# Patient Record
Sex: FEMALE | Race: WHITE | NOT HISPANIC OR LATINO | Employment: OTHER | URBAN - METROPOLITAN AREA
[De-identification: names, ages, dates, MRNs, and addresses within clinical notes are randomized per-mention and may not be internally consistent; named-entity substitution may affect disease eponyms.]

---

## 2017-01-10 ENCOUNTER — OFFICE VISIT (OUTPATIENT)
Dept: ORTHOPEDIC SURGERY | Facility: CLINIC | Age: 82
End: 2017-01-10

## 2017-01-10 DIAGNOSIS — M79.605 LOW BACK PAIN RADIATING TO LEFT LEG: Primary | ICD-10-CM

## 2017-01-10 DIAGNOSIS — M54.50 LOW BACK PAIN RADIATING TO LEFT LEG: Primary | ICD-10-CM

## 2017-01-10 DIAGNOSIS — M48.061 SPINAL STENOSIS OF LUMBAR REGION: ICD-10-CM

## 2017-01-10 PROCEDURE — 99214 OFFICE O/P EST MOD 30 MIN: CPT | Performed by: ORTHOPAEDIC SURGERY

## 2017-01-10 PROCEDURE — 72100 X-RAY EXAM L-S SPINE 2/3 VWS: CPT | Performed by: ORTHOPAEDIC SURGERY

## 2017-01-10 RX ORDER — HYDROCODONE BITARTRATE AND ACETAMINOPHEN 5; 325 MG/1; MG/1
1 TABLET ORAL EVERY 6 HOURS PRN
COMMUNITY

## 2017-01-10 RX ORDER — ACETAMINOPHEN 500 MG
500 TABLET ORAL EVERY 6 HOURS PRN
COMMUNITY

## 2017-01-10 RX ORDER — KETOROLAC TROMETHAMINE 30 MG/ML
INJECTION, SOLUTION INTRAMUSCULAR; INTRAVENOUS
COMMUNITY

## 2017-01-10 RX ORDER — MELOXICAM 15 MG/1
15 TABLET ORAL DAILY
COMMUNITY

## 2017-01-10 RX ORDER — UREA 10 %
3 LOTION (ML) TOPICAL
COMMUNITY

## 2017-01-10 RX ORDER — GABAPENTIN 300 MG/1
300 CAPSULE ORAL 3 TIMES DAILY
COMMUNITY

## 2017-01-10 RX ORDER — ASPIRIN 81 MG/1
81 TABLET ORAL DAILY
COMMUNITY

## 2017-01-10 RX ORDER — LORATADINE 10 MG/1
10 TABLET ORAL DAILY
COMMUNITY

## 2017-01-10 RX ORDER — SODIUM BICARBONATE 650 MG/1
650 TABLET ORAL 3 TIMES DAILY
COMMUNITY

## 2017-01-10 RX ORDER — LACTULOSE 10 G/15ML
10 SOLUTION ORAL DAILY
COMMUNITY

## 2017-01-10 NOTE — MR AVS SNAPSHOT
Gertrudis Jalen   1/10/2017 7:45 AM   Office Visit    Dept Phone:  [phone]   Encounter #:  [id number]    Provider:  Feliberto Darby MD   Department:  [facility]                Your Full Care Plan              Your Updated Medication List          This list is accurate as of: 1/10/17  9:03 AM.  Always use your most recent med list.                acetaminophen 500 MG tablet   Commonly known as:  TYLENOL       amiodarone 200 MG tablet   Commonly known as:  PACERONE       aspirin 81 MG EC tablet       CALCIUM + D PO       gabapentin 300 MG capsule   Commonly known as:  NEURONTIN       HYDROcodone-acetaminophen 5-325 MG per tablet   Commonly known as:  NORCO       ketorolac 30 MG/ML injection   Commonly known as:  TORADOL       lactulose 10 GM/15ML solution   Commonly known as:  CHRONULAC       loratadine 10 MG tablet   Commonly known as:  CLARITIN       melatonin 1 MG tablet       meloxicam 15 MG tablet   Commonly known as:  MOBIC       NORVASC 5 MG tablet   Generic drug:  amLODIPine       omeprazole 40 MG capsule   Commonly known as:  priLOSEC       sodium bicarbonate 650 MG tablet               We Performed the Following     XR Spine Lumbar AP & Lateral       You Were Diagnosed With        Codes Comments    Low back pain radiating to left leg    -  Primary ICD-10-CM: M54.5  ICD-9-CM: 724.2       Instructions     None    Patient Instructions History      Upcoming Appointments     Visit Type Date Time Department    FOLLOW UP 1/10/2017  7:45 AM [facility]      [employer] Signup     [facility] [employer] allows you to send messages to your doctor, view your test results, renew your prescriptions, schedule appointments, and more. To sign up, go to [employer] and click on the Sign Up Now link in the New User? box. Enter your [employer] Activation Code exactly as it appears below along with the last four digits of your Social  Security Number and your Date of Birth () to complete the sign-up process. If you do not sign up before the expiration date, you must request a new code.    [employer] Activation Code: [email or web address]  Expires: 2017  5:34 AM    If you have questions, you can email [email or web address] or call [phone] to talk to our [employer] staff. Remember, [employer] is NOT to be used for urgent needs. For medical emergencies, dial 911.               Other Info from Your Visit           Allergies     No Known Allergies      Reason for Visit     Lumbar Spine - Follow-up           Vital Signs     Smoking Status                   Never Smoker           Problems and Diagnoses Noted     Low back pain radiating to left leg    -  Primary

## 2017-01-10 NOTE — PROGRESS NOTES
She complains of severe him a chronic, variable bilateral leg pain pain below the knees.  She has peripheral neuropathy by her own account.  The pain is moderate to severe activity related but also present at rest.  No balance bowel or bladder complaints.  I did thoracic kyphoplasty is about a year ago from which she did well.  She is blind.  On exam she has excellent strength seemingly intact sensation and no pain to palpation of the thoracic or lumbar spine.  Overall posture is excellent.  Two-view x-rays of the lumbar spine obtained today in my office show excellent lumbar lordosis and no evidence of new fracture.  CT scan of the lumbar spine performed at [facility] last month demonstrated cystic dilatation of the sacrum from presumptive the dural cyst through which it appeared there was a subacute or old fracture.  She has severe stenosis at 3 for and 45 and no other new fractures.  The fracture noted at T9 is since been addressed.  I agree otherwise with the radiologist report.  I think she has leg pain from spinal stenosis, and there may be a contribution from neuropathy as well.  I'm sending her for lumbar epidural steroid injections and we'll see where to go from there..

## 2017-02-15 ENCOUNTER — HOSPITAL ENCOUNTER (OUTPATIENT)
Dept: GENERAL RADIOLOGY | Facility: HOSPITAL | Age: 82
Discharge: HOME | End: 2017-02-15

## 2017-02-15 ENCOUNTER — ANESTHESIA (OUTPATIENT)
Dept: PAIN MEDICINE | Facility: HOSPITAL | Age: 82
End: 2017-02-15

## 2017-02-15 ENCOUNTER — HOSPITAL ENCOUNTER (OUTPATIENT)
Dept: PAIN MEDICINE | Facility: HOSPITAL | Age: 82
Discharge: HOME | End: 2017-02-15
Attending: ORTHOPAEDIC SURGERY | Admitting: ORTHOPAEDIC SURGERY

## 2017-02-15 ENCOUNTER — ANESTHESIA EVENT (OUTPATIENT)
Dept: PAIN MEDICINE | Facility: HOSPITAL | Age: 82
End: 2017-02-15

## 2017-02-15 VITALS
OXYGEN SATURATION: 98 % | BODY MASS INDEX: 24.59 KG/M2 | DIASTOLIC BLOOD PRESSURE: 78 MMHG | HEIGHT: 66 IN | WEIGHT: 153 LBS | RESPIRATION RATE: 16 BRPM | HEART RATE: 69 BPM | SYSTOLIC BLOOD PRESSURE: 128 MMHG

## 2017-02-15 DIAGNOSIS — M48.061 SPINAL STENOSIS OF LUMBAR REGION: ICD-10-CM

## 2017-02-15 DIAGNOSIS — R52 PAIN: ICD-10-CM

## 2017-02-15 PROCEDURE — 77003 FLUOROGUIDE FOR SPINE INJECT: CPT

## 2017-02-15 PROCEDURE — 0 IOPAMIDOL 41 % SOLUTION: Performed by: ANESTHESIOLOGY

## 2017-02-15 PROCEDURE — C1755 CATHETER, INTRASPINAL: HCPCS

## 2017-02-15 PROCEDURE — 25010000002 METHYLPREDNISOLONE PER 80 MG: Performed by: ANESTHESIOLOGY

## 2017-02-15 RX ORDER — POTASSIUM CHLORIDE 750 MG/1
20 TABLET, EXTENDED RELEASE ORAL DAILY
COMMUNITY

## 2017-02-15 RX ORDER — SODIUM CHLORIDE 0.9 % (FLUSH) 0.9 %
1-10 SYRINGE (ML) INJECTION AS NEEDED
Status: DISCONTINUED | OUTPATIENT
Start: 2017-02-15 | End: 2017-02-16 | Stop reason: HOSPADM

## 2017-02-15 RX ORDER — FUROSEMIDE 40 MG/1
40 TABLET ORAL DAILY
COMMUNITY

## 2017-02-15 RX ORDER — FENTANYL CITRATE 50 UG/ML
50 INJECTION, SOLUTION INTRAMUSCULAR; INTRAVENOUS
Status: DISCONTINUED | OUTPATIENT
Start: 2017-02-15 | End: 2017-02-16 | Stop reason: HOSPADM

## 2017-02-15 RX ORDER — METHYLPREDNISOLONE ACETATE 80 MG/ML
80 INJECTION, SUSPENSION INTRA-ARTICULAR; INTRALESIONAL; INTRAMUSCULAR; SOFT TISSUE ONCE
Status: COMPLETED | OUTPATIENT
Start: 2017-02-15 | End: 2017-02-15

## 2017-02-15 RX ORDER — MIDAZOLAM HYDROCHLORIDE 1 MG/ML
1 INJECTION INTRAMUSCULAR; INTRAVENOUS
Status: DISCONTINUED | OUTPATIENT
Start: 2017-02-15 | End: 2017-02-16 | Stop reason: HOSPADM

## 2017-02-15 RX ORDER — LIDOCAINE HYDROCHLORIDE 10 MG/ML
1 INJECTION, SOLUTION INFILTRATION; PERINEURAL ONCE
Status: DISCONTINUED | OUTPATIENT
Start: 2017-02-15 | End: 2017-02-16 | Stop reason: HOSPADM

## 2017-02-15 RX ADMIN — METHYLPREDNISOLONE ACETATE 80 MG: 80 INJECTION, SUSPENSION INTRA-ARTICULAR; INTRALESIONAL; INTRAMUSCULAR; SOFT TISSUE at 14:33

## 2017-02-15 RX ADMIN — IOPAMIDOL 10 ML: 408 INJECTION, SOLUTION INTRATHECAL at 14:32

## 2017-02-15 NOTE — ANESTHESIA PROCEDURE NOTES
PAIN Epidural block    Patient location during procedure: pain clinic  Stop Time: 2/15/2017 2:31 PM  Indication:procedure for pain  Performed By  Anesthesiologist: EDVIN MARTINES  Preanesthetic Checklist  Completed: patient identified, site marked, surgical consent, pre-op evaluation, timeout performed, IV checked, risks and benefits discussed and monitors and equipment checked  Additional Notes  Fluoro used for needle placement    Dx:  Lumbar neuritis  Lumbar spinal stenosis  Lumbar degen disc dz.  Epidural Block Prep:  Pt Position:prone  Sterile Tech:cap, gloves, mask and sterile barrier  Monitoring:blood pressure monitoring, continuous pulse oximetry and EKG  Epidural Block Procedure:  Approach:midline  Guidance: fluoroscopy  Location:lumbar  Level:5-6  Needle Type:Tuohy  Needle Gauge:20  Aspiration:negative  Medications:  Depomedrol:80  Preservative Free Saline:3mL  Isovue:3mL  Comments:B/l spread  Post Assessment:  Dressing:occlusive dressing applied  Pt Tolerance:patient tolerated the procedure well with no apparent complications  Complications:no

## 2017-02-15 NOTE — H&P
[facility]    History and Physical    Patient Name: Gertrudis Rao  :  2/10/1923  MRN:  [id number]  Date of Admission: 2/15/2017    Subjective     Patient is a 89 y.o. female presents with chief complaint of chronic, intermitent, moderate low back pain.  Onset of symptoms was gradual starting 8 months ago.  Symptoms are associated/aggravated by nothing in particular. Symptoms improve with nothing.  Pain 4-8/10 depending on activity.  Radiates into lower extremities.  Denies numbness/tingling/weakness.  Presents for [facility].      The following portions of the patients history were reviewed and updated as appropriate: current medications, allergies, past medical history, past surgical history, past family history, past social history and problem list                Objective     Past Medical History:   Past Medical History   Diagnosis Date   • Cardiac disease    • Diabetes mellitus    • Hypertension      Past Surgical History:   Past Surgical History   Procedure Laterality Date   • Cholecystectomy     • Tonsillectomy       Family History:   Family History   Problem Relation Age of Onset   • Leukemia Mother    • Cancer Father      stomach   • Cancer Sister      pancreatic   • Cancer Brother      kidney     Social History:   Social History   Substance Use Topics   • Smoking status: Never Smoker   • Smokeless tobacco: Not on file   • Alcohol use Not on file       Vital Signs Range for the last 24 hours  Temperature:     Temp Source:     BP:     Pulse:     Respirations:     SPO2:     O2 Amount (l/min):     O2 Devices     Weight:           --------------------------------------------------------------------------------    Current Outpatient Prescriptions   Medication Sig Dispense Refill   • acetaminophen (TYLENOL) 500 MG tablet Take 500 mg by mouth Every 6 (Six) Hours As Needed for mild pain (1-3).     • amiodarone (PACERONE) 200 MG tablet Take 100 mg by mouth. Every other day     • amLODIPine (NORVASC) 5 MG  tablet Take 5 mg by mouth Daily.     • aspirin 81 MG EC tablet Take 81 mg by mouth Daily.     • Calcium Citrate-Vitamin D (CALCIUM + D PO) Take  by mouth.     • gabapentin (NEURONTIN) 300 MG capsule Take 300 mg by mouth 3 (Three) Times a Day.     • HYDROcodone-acetaminophen (NORCO) 5-325 MG per tablet Take 1 tablet by mouth Every 6 (Six) Hours As Needed.     • ketorolac (TORADOL) 30 MG/ML injection Infuse  into a venous catheter.     • lactulose (CHRONULAC) 10 GM/15ML solution Take 20 g by mouth 2 (Two) Times a Day As Needed.     • loratadine (CLARITIN) 10 MG tablet Take 10 mg by mouth Daily.     • melatonin 1 MG tablet Take 3 mg by mouth.     • meloxicam (MOBIC) 15 MG tablet Take 15 mg by mouth Daily.     • omeprazole (priLOSEC) 40 MG capsule Take 40 mg by mouth Daily.     • sodium bicarbonate 650 MG tablet Take 650 mg by mouth 4 (Four) Times a Day.       No current facility-administered medications for this encounter.        --------------------------------------------------------------------------------  Assessment/Plan    Lumbar degen disc disease  Lumbar radiculopathy  Lumbar spinal stenosis    1. Lumbar Epidural with fluoroscopy +/- epidurogram (if needed)  2. Physical therapy exercises at home as prescribed by physical therapy or from the pain clinic handout (given to the patient). Continuation of these exercises every day, or multiple times per week, even when the patient has good pain relief, was stressed to the patient as a preventative measure to decrease the frequency and severity of future pain episodes.  3. Continue pain medicines as already prescribed. If patient not currently taking any, it is recommended to begin Acetaminophen 1000 mg po q 8 hours. If other medicines containing Acetaminophen are currently prescribed, maintain daily dose at 3000mg.   4. If they can tolerate NSAIDS, it is recommended to take Ibuprofen 600 mg po q 6 hours for 7 days during pain exacerbations. Alternatively, they may  substitute an NSAID of their choice (e.g. Aleve)  5. Heat and ice to the affected area as tolerated for pain control. It was discussed that heating pads can cause burns.  6. Low impact exercise such as walking or water exercise was recommended to maintain overall health and aid in weight control.   7. Follow up as needed for subsequent injections.  8. Patient was counseled to abstain from tobacco products.       Anesthesia Evaluation     Patient summary reviewed and Nursing notes reviewed   no history of anesthetic complications:     Airway   Mallampati: II  TM distance: >3 FB  Dental    (+) upper dentures    Pulmonary - negative pulmonary ROS and normal exam   Cardiovascular - normal exam    (+) hypertension,       Neuro/Psych- negative ROS and neuro exam normal  GI/Hepatic/Renal/Endo    (+)  GERD, diabetes mellitus,     Musculoskeletal (-) normal exam    (+) back pain, Straight Leg Test,   Abdominal  - normal exam   Substance History - negative use     OB/GYN negative ob/gyn ROS         Other                               Diagnosis and Plan    Treatment Plan  ASA 3      Procedures: Lumbar Epidural Steroid Injection(LESI), With fluoroscopy,       Anesthetic plan and risks discussed with patient.          * No Diagnosis Codes entered *

## 2017-06-06 ENCOUNTER — APPOINTMENT (OUTPATIENT)
Dept: GENERAL RADIOLOGY | Facility: HOSPITAL | Age: 82
End: 2017-06-06

## 2017-06-06 ENCOUNTER — APPOINTMENT (OUTPATIENT)
Dept: CT IMAGING | Facility: HOSPITAL | Age: 82
End: 2017-06-06

## 2017-06-06 ENCOUNTER — HOSPITAL ENCOUNTER (INPATIENT)
Facility: HOSPITAL | Age: 82
LOS: 1 days | Discharge: HOSPICE | End: 2017-06-08
Attending: EMERGENCY MEDICINE | Admitting: INTERNAL MEDICINE

## 2017-06-06 DIAGNOSIS — G92.8 TOXIC METABOLIC ENCEPHALOPATHY: ICD-10-CM

## 2017-06-06 DIAGNOSIS — R41.82 ALTERED MENTAL STATUS, UNSPECIFIED ALTERED MENTAL STATUS TYPE: Primary | ICD-10-CM

## 2017-06-06 PROBLEM — N39.0 RECURRENT UTI: Status: ACTIVE | Noted: 2017-06-06

## 2017-06-06 PROBLEM — Z74.09 IMMOBILITY: Status: ACTIVE | Noted: 2017-06-06

## 2017-06-06 PROBLEM — E86.0 DEHYDRATION: Status: ACTIVE | Noted: 2017-06-06

## 2017-06-06 PROBLEM — F03.90 DEMENTIA (HCC): Status: ACTIVE | Noted: 2017-06-06

## 2017-06-06 PROBLEM — H54.8 LEGALLY BLIND: Status: ACTIVE | Noted: 2017-06-06

## 2017-06-06 PROBLEM — E87.1 HYPONATREMIA: Status: ACTIVE | Noted: 2017-06-06

## 2017-06-06 LAB
ALBUMIN SERPL-MCNC: 3.8 G/DL (ref 3.5–5.2)
ALBUMIN/GLOB SERPL: 1.5 G/DL
ALP SERPL-CCNC: 96 U/L (ref 39–117)
ALT SERPL W P-5'-P-CCNC: 6 U/L (ref 1–33)
ANION GAP SERPL CALCULATED.3IONS-SCNC: 9.4 MMOL/L
AST SERPL-CCNC: 16 U/L (ref 1–32)
BACTERIA UR QL AUTO: NORMAL /HPF
BASOPHILS # BLD AUTO: 0.01 10*3/MM3 (ref 0–0.2)
BASOPHILS NFR BLD AUTO: 0.2 % (ref 0–1.5)
BILIRUB SERPL-MCNC: 0.2 MG/DL (ref 0.1–1.2)
BILIRUB UR QL STRIP: NEGATIVE
BUN BLD-MCNC: 18 MG/DL (ref 8–23)
BUN/CREAT SERPL: 17.8 (ref 7–25)
CALCIUM SPEC-SCNC: 9 MG/DL (ref 8.2–9.6)
CHLORIDE SERPL-SCNC: 92 MMOL/L (ref 98–107)
CLARITY UR: CLEAR
CO2 SERPL-SCNC: 27.6 MMOL/L (ref 22–29)
COLOR UR: YELLOW
CREAT BLD-MCNC: 1.01 MG/DL (ref 0.57–1)
DEPRECATED RDW RBC AUTO: 44 FL (ref 37–54)
EOSINOPHIL # BLD AUTO: 0.06 10*3/MM3 (ref 0–0.7)
EOSINOPHIL NFR BLD AUTO: 1 % (ref 0.3–6.2)
ERYTHROCYTE [DISTWIDTH] IN BLOOD BY AUTOMATED COUNT: 13.4 % (ref 11.7–13)
GFR SERPL CREATININE-BSD FRML MDRD: 51 ML/MIN/1.73
GLOBULIN UR ELPH-MCNC: 2.6 GM/DL
GLUCOSE BLD-MCNC: 129 MG/DL (ref 65–99)
GLUCOSE UR STRIP-MCNC: NEGATIVE MG/DL
HCT VFR BLD AUTO: 30.8 % (ref 35.6–45.5)
HGB BLD-MCNC: 10.4 G/DL (ref 11.9–15.5)
HGB UR QL STRIP.AUTO: ABNORMAL
HYALINE CASTS UR QL AUTO: NORMAL /LPF
IMM GRANULOCYTES # BLD: 0.03 10*3/MM3 (ref 0–0.03)
IMM GRANULOCYTES NFR BLD: 0.5 % (ref 0–0.5)
KETONES UR QL STRIP: NEGATIVE
LEUKOCYTE ESTERASE UR QL STRIP.AUTO: ABNORMAL
LYMPHOCYTES # BLD AUTO: 1.81 10*3/MM3 (ref 0.9–4.8)
LYMPHOCYTES NFR BLD AUTO: 29.4 % (ref 19.6–45.3)
MCH RBC QN AUTO: 30.5 PG (ref 26.9–32)
MCHC RBC AUTO-ENTMCNC: 33.8 G/DL (ref 32.4–36.3)
MCV RBC AUTO: 90.3 FL (ref 80.5–98.2)
MONOCYTES # BLD AUTO: 0.62 10*3/MM3 (ref 0.2–1.2)
MONOCYTES NFR BLD AUTO: 10.1 % (ref 5–12)
NEUTROPHILS # BLD AUTO: 3.63 10*3/MM3 (ref 1.9–8.1)
NEUTROPHILS NFR BLD AUTO: 58.8 % (ref 42.7–76)
NITRITE UR QL STRIP: NEGATIVE
PH UR STRIP.AUTO: <=5 [PH] (ref 5–8)
PLATELET # BLD AUTO: 169 10*3/MM3 (ref 140–500)
PMV BLD AUTO: 8.9 FL (ref 6–12)
POTASSIUM BLD-SCNC: 4.8 MMOL/L (ref 3.5–5.2)
PROT SERPL-MCNC: 6.4 G/DL (ref 6–8.5)
PROT UR QL STRIP: NEGATIVE
RBC # BLD AUTO: 3.41 10*6/MM3 (ref 3.9–5.2)
RBC # UR: NORMAL /HPF
REF LAB TEST METHOD: NORMAL
SODIUM BLD-SCNC: 129 MMOL/L (ref 136–145)
SP GR UR STRIP: 1.01 (ref 1–1.03)
SQUAMOUS #/AREA URNS HPF: NORMAL /HPF
TROPONIN T SERPL-MCNC: <0.01 NG/ML (ref 0–0.03)
UROBILINOGEN UR QL STRIP: ABNORMAL
WBC NRBC COR # BLD: 6.16 10*3/MM3 (ref 4.5–10.7)
WBC UR QL AUTO: NORMAL /HPF
YEAST URNS QL MICRO: NORMAL /HPF

## 2017-06-06 PROCEDURE — 80053 COMPREHEN METABOLIC PANEL: CPT | Performed by: EMERGENCY MEDICINE

## 2017-06-06 PROCEDURE — G0378 HOSPITAL OBSERVATION PER HR: HCPCS

## 2017-06-06 PROCEDURE — 93010 ELECTROCARDIOGRAM REPORT: CPT | Performed by: INTERNAL MEDICINE

## 2017-06-06 PROCEDURE — 70450 CT HEAD/BRAIN W/O DYE: CPT

## 2017-06-06 PROCEDURE — 71010 HC CHEST PA OR AP: CPT

## 2017-06-06 PROCEDURE — 93005 ELECTROCARDIOGRAM TRACING: CPT | Performed by: EMERGENCY MEDICINE

## 2017-06-06 PROCEDURE — 99285 EMERGENCY DEPT VISIT HI MDM: CPT

## 2017-06-06 PROCEDURE — 81001 URINALYSIS AUTO W/SCOPE: CPT | Performed by: EMERGENCY MEDICINE

## 2017-06-06 PROCEDURE — 84484 ASSAY OF TROPONIN QUANT: CPT | Performed by: EMERGENCY MEDICINE

## 2017-06-06 PROCEDURE — 85025 COMPLETE CBC W/AUTO DIFF WBC: CPT | Performed by: EMERGENCY MEDICINE

## 2017-06-06 PROCEDURE — 87086 URINE CULTURE/COLONY COUNT: CPT | Performed by: EMERGENCY MEDICINE

## 2017-06-06 RX ORDER — AMLODIPINE BESYLATE 5 MG/1
5 TABLET ORAL DAILY
Status: DISCONTINUED | OUTPATIENT
Start: 2017-06-07 | End: 2017-06-07

## 2017-06-06 RX ORDER — ONDANSETRON 2 MG/ML
4 INJECTION INTRAMUSCULAR; INTRAVENOUS EVERY 6 HOURS PRN
Status: DISCONTINUED | OUTPATIENT
Start: 2017-06-06 | End: 2017-06-07

## 2017-06-06 RX ORDER — PANTOPRAZOLE SODIUM 40 MG/1
40 TABLET, DELAYED RELEASE ORAL EVERY MORNING
Status: DISCONTINUED | OUTPATIENT
Start: 2017-06-07 | End: 2017-06-07

## 2017-06-06 RX ORDER — DEXTROSE MONOHYDRATE 25 G/50ML
25 INJECTION, SOLUTION INTRAVENOUS
Status: DISCONTINUED | OUTPATIENT
Start: 2017-06-06 | End: 2017-06-07

## 2017-06-06 RX ORDER — CETIRIZINE HYDROCHLORIDE 10 MG/1
5 TABLET ORAL DAILY
Status: DISCONTINUED | OUTPATIENT
Start: 2017-06-07 | End: 2017-06-07

## 2017-06-06 RX ORDER — SODIUM CHLORIDE 9 MG/ML
100 INJECTION, SOLUTION INTRAVENOUS CONTINUOUS
Status: DISCONTINUED | OUTPATIENT
Start: 2017-06-06 | End: 2017-06-07

## 2017-06-06 RX ORDER — SODIUM CHLORIDE 0.9 % (FLUSH) 0.9 %
1-10 SYRINGE (ML) INJECTION AS NEEDED
Status: DISCONTINUED | OUTPATIENT
Start: 2017-06-06 | End: 2017-06-07

## 2017-06-06 RX ORDER — HEPARIN SODIUM 5000 [USP'U]/ML
5000 INJECTION, SOLUTION INTRAVENOUS; SUBCUTANEOUS EVERY 12 HOURS SCHEDULED
Status: DISCONTINUED | OUTPATIENT
Start: 2017-06-06 | End: 2017-06-07

## 2017-06-06 RX ORDER — LACTULOSE 10 G/15ML
10 SOLUTION ORAL DAILY
Status: DISCONTINUED | OUTPATIENT
Start: 2017-06-07 | End: 2017-06-07

## 2017-06-06 RX ORDER — NICOTINE POLACRILEX 4 MG
15 LOZENGE BUCCAL
Status: DISCONTINUED | OUTPATIENT
Start: 2017-06-06 | End: 2017-06-07

## 2017-06-06 RX ORDER — ASPIRIN 81 MG/1
81 TABLET ORAL DAILY
Status: DISCONTINUED | OUTPATIENT
Start: 2017-06-07 | End: 2017-06-07

## 2017-06-06 RX ORDER — AMIODARONE HYDROCHLORIDE 100 MG/1
100 TABLET ORAL
Status: DISCONTINUED | OUTPATIENT
Start: 2017-06-07 | End: 2017-06-07

## 2017-06-06 NOTE — ED NOTES
Nursing home reports intermittent confusion for past 3 days. Pt is also legally blind, but reports vision is worse than usual for the past 2 days.      Annmarie Wills RN  06/06/17 5538

## 2017-06-06 NOTE — ED PROVIDER NOTES
EMERGENCY DEPARTMENT ENCOUNTER       CHIEF COMPLAINT  Chief Complaint: Altered Mental Status  History given by: Patient, Sons  History limited by: Patient's mental status change   Room Number: 26/26  PMD: Tigre Rao Jr., MD      HPI:  HPI Comments: Pt is a NH resident with h/o UTIs who presents to the ED with intermittent episodes of altered mental status onset about 2 days ago. Pt has had visual hallucinations and has been disoriented; this AM, sons found pt crying in her bed and pt was unsure as to why she was crying. Per family, pt has been receiving antibiotic injections to treat a UTI for the past week. Pt has not had any focal weakness/numbness. There are no other complaints at this time.     Patient is a 89 y.o. female presenting with altered mental status.   Altered Mental Status   Presenting symptoms: behavior changes, confusion (and disorientation) and disorientation    Severity:  Moderate  Most recent episode:  Today  Episode history:  Multiple  Duration: onset about 2 days ago.  Timing:  Intermittent  Progression:  Waxing and waning  Context: nursing home resident and recent illness (pt is currently being treated for a UTI)    Associated symptoms: hallucinations (visual)    Associated symptoms: no weakness          PAST MEDICAL HISTORY  Active Ambulatory Problems     Diagnosis Date Noted   • No Active Ambulatory Problems     Resolved Ambulatory Problems     Diagnosis Date Noted   • No Resolved Ambulatory Problems     Past Medical History:   Diagnosis Date   • Cardiac disease    • Diabetes mellitus    • Hypertension    • Low back pain          PAST SURGICAL HISTORY  Past Surgical History:   Procedure Laterality Date   • CHOLECYSTECTOMY  1971   • TONSILLECTOMY  1933         FAMILY HISTORY  Family History   Problem Relation Age of Onset   • Leukemia Mother    • Cancer Father      stomach   • Cancer Sister      pancreatic   • Cancer Brother      kidney         SOCIAL HISTORY  Social History     Social  History   • Marital status:      Spouse name: N/A   • Number of children: N/A   • Years of education: N/A     Occupational History   • Not on file.     Social History Main Topics   • Smoking status: Never Smoker   • Smokeless tobacco: Never Used   • Alcohol use Defer   • Drug use: Defer   • Sexual activity: Defer     Other Topics Concern   • Not on file     Social History Narrative         ALLERGIES  Review of patient's allergies indicates no known allergies.        REVIEW OF SYSTEMS  Review of Systems   Unable to perform ROS: Mental status change   Neurological: Negative for weakness and numbness.   Psychiatric/Behavioral: Positive for confusion (and disorientation) and hallucinations (visual).            PHYSICAL EXAM  ED Triage Vitals   Temp Heart Rate Resp BP SpO2   06/06/17 1437 06/06/17 1437 06/06/17 1437 06/06/17 1437 06/06/17 1437   97 °F (36.1 °C) 65 16 120/70 97 % WNL      Temp src Heart Rate Source Patient Position BP Location FiO2 (%)   -- -- -- -- --              Physical Exam   Constitutional: No distress.   HENT:   Head: Normocephalic.   Mouth/Throat: Mucous membranes are normal.   Eyes: EOM are normal. Pupils are equal, round, and reactive to light.   Neck: Normal range of motion. Neck supple.   Cardiovascular: Normal rate, regular rhythm and normal heart sounds.    Pulmonary/Chest: Effort normal and breath sounds normal. No respiratory distress. She has no decreased breath sounds. She has no wheezes. She has no rhonchi. She has no rales.   Abdominal: Soft. There is no tenderness. There is no rebound and no guarding.   Musculoskeletal: Normal range of motion.   Neurological: She is alert. She has normal sensation. She is disoriented (to place).   Skin: Skin is warm and dry.   Psychiatric: Mood and affect normal.   Nursing note and vitals reviewed.          LAB RESULTS  Recent Results (from the past 24 hour(s))   Comprehensive Metabolic Panel    Collection Time: 06/06/17  3:47 PM   Result  Value Ref Range    Glucose 129 (H) 65 - 99 mg/dL    BUN 18 8 - 23 mg/dL    Creatinine 1.01 (H) 0.57 - 1.00 mg/dL    Sodium 129 (L) 136 - 145 mmol/L    Potassium 4.8 3.5 - 5.2 mmol/L    Chloride 92 (L) 98 - 107 mmol/L    CO2 27.6 22.0 - 29.0 mmol/L    Calcium 9.0 8.2 - 9.6 mg/dL    Total Protein 6.4 6.0 - 8.5 g/dL    Albumin 3.80 3.50 - 5.20 g/dL    ALT (SGPT) 6 1 - 33 U/L    AST (SGOT) 16 1 - 32 U/L    Alkaline Phosphatase 96 39 - 117 U/L    Total Bilirubin 0.2 0.1 - 1.2 mg/dL    eGFR Non African Amer 51 (L) >60 mL/min/1.73    Globulin 2.6 gm/dL    A/G Ratio 1.5 g/dL    BUN/Creatinine Ratio 17.8 7.0 - 25.0    Anion Gap 9.4 mmol/L   Troponin    Collection Time: 06/06/17  3:47 PM   Result Value Ref Range    Troponin T <0.010 0.000 - 0.030 ng/mL   CBC Auto Differential    Collection Time: 06/06/17  3:47 PM   Result Value Ref Range    WBC 6.16 4.50 - 10.70 10*3/mm3    RBC 3.41 (L) 3.90 - 5.20 10*6/mm3    Hemoglobin 10.4 (L) 11.9 - 15.5 g/dL    Hematocrit 30.8 (L) 35.6 - 45.5 %    MCV 90.3 80.5 - 98.2 fL    MCH 30.5 26.9 - 32.0 pg    MCHC 33.8 32.4 - 36.3 g/dL    RDW 13.4 (H) 11.7 - 13.0 %    RDW-SD 44.0 37.0 - 54.0 fl    MPV 8.9 6.0 - 12.0 fL    Platelets 169 140 - 500 10*3/mm3    Neutrophil % 58.8 42.7 - 76.0 %    Lymphocyte % 29.4 19.6 - 45.3 %    Monocyte % 10.1 5.0 - 12.0 %    Eosinophil % 1.0 0.3 - 6.2 %    Basophil % 0.2 0.0 - 1.5 %    Immature Grans % 0.5 0.0 - 0.5 %    Neutrophils, Absolute 3.63 1.90 - 8.10 10*3/mm3    Lymphocytes, Absolute 1.81 0.90 - 4.80 10*3/mm3    Monocytes, Absolute 0.62 0.20 - 1.20 10*3/mm3    Eosinophils, Absolute 0.06 0.00 - 0.70 10*3/mm3    Basophils, Absolute 0.01 0.00 - 0.20 10*3/mm3    Immature Grans, Absolute 0.03 0.00 - 0.03 10*3/mm3   Urinalysis With / Culture If Indicated    Collection Time: 06/06/17  4:24 PM   Result Value Ref Range    Color, UA Yellow Yellow, Straw    Appearance, UA Clear Clear    pH, UA <=5.0 5.0 - 8.0    Specific Gravity, UA 1.012 1.005 - 1.030    Glucose,  UA Negative Negative    Ketones, UA Negative Negative    Bilirubin, UA Negative Negative    Blood, UA Trace (A) Negative    Protein, UA Negative Negative    Leuk Esterase, UA Small (1+) (A) Negative    Nitrite, UA Negative Negative    Urobilinogen, UA 0.2 E.U./dL 0.2 - 1.0 E.U./dL       Ordered the above labs and reviewed the results.            RADIOLOGY  CT Head Without Contrast   Preliminary Result   No significant change when compared to prior head CT from   [facility] 10/01/2015. There is mild small vessel disease   in cerebral white matter, calcified atherosclerotic plaques in the   distal left vertebral artery and cavernous segments of internal carotid   arteries bilaterally and degenerative changes in the temporomandibular   joints bilaterally right greater than left. The remainder of the head CT   is within normal limits and the etiology of the altered mental status   and visual hallucinations is not established on this exam. The results   were communicated to Dr. Phipps in the emergency room by telephone on   06/06/2017 at 3:50 PM.    Interpreted by radiologist. Discussed with radiologist. Independently viewed by me.         XR Chest 1 View   Final Result   No focal pulmonary consolidation. Borderline heart size.   Tortuous aorta. Follow-up as clinically indicated.       This report was finalized on 6/6/2017 3:36 PM by Dr. David Chang MD.    Interpreted by radiologist. Independently viewed by me.             Ordered the above noted radiological studies. Reviewed by me in PACS.           PROCEDURES  Procedures    EKG:           EKG time: 15:11  Rhythm/Rate: NSR rate 63  P waves and NC: 1st degree AV block  QRS, axis: LAFB       Interpreted Contemporaneously by me, independently viewed  Unchanged compared to prior 01/31/16          PROGRESS AND CONSULTS  ED Course   Comment By Time   5:45 PM  Patient with altered mental status.  Has UTI and being treated with invanz.  Family state  that she is not normally demented or confused.  Here she has been intermittently very confused.  Discussed with Dr. Mendieta who will admit. Leonides Phipps MD 06/06 5934     3:00 PM: UA, blood work, CXR, CT Head, and EKG ordered for further evaluation. Pt is not a candidate for tPA as the time of onset of pt's symptoms was about 2 days ago.     4:53 PM: Rechecked pt. Pt is resting comfortably and appears in no acute distress. Informed pt's family that pt's UA is unremarkable. Troponin is negative. CT Head and CXR are negative acute. Need for admission for further evaluation. Pt's family agrees with plan. Decision time to admit: Now.     5:33 PM: Placed call to A for admission.     5:44 PM: Discussed case with Dr. Mendieta, hospitalist. He will admit pt to a telemetry bed.                 MEDICAL DECISION MAKING      MDM  Number of Diagnoses or Management Options     Amount and/or Complexity of Data Reviewed  Clinical lab tests: ordered and reviewed (WBC is 6.16.)  Tests in the radiology section of CPT®: ordered and reviewed (CT Head is negative acute.)  Tests in the medicine section of CPT®: reviewed and ordered (EKG interpreted.   )  Discussion of test results with the performing providers: yes (CT Head results d/w radiologist.   )  Decide to obtain previous medical records or to obtain history from someone other than the patient: yes  Obtain history from someone other than the patient: yes (Sons provide significant hx. )  Review and summarize past medical records: yes (Per NH records, pt is currently receiving IM Invanz to treat for a UTI. )  Discuss the patient with other providers: yes (Case d/w Dr. Mendieta, hospitalist, who will admit pt to a telemetry bed.   )    Patient Progress  Patient progress: stable             DIAGNOSIS  Final diagnoses:   Altered mental status, unspecified altered mental status type   Toxic metabolic encephalopathy         DISPOSITION  Pt admitted to telemetry.      ADMISSION    Discussed  treatment plan and reason for admission with pt/family and admitting physician.  Pt/family voiced understanding of the plan for admission for further testing/treatment as needed.             Latest Documented Vital Signs:  As of 5:50 PM  BP- 101/69 HR- 61 Temp- 97.6 °F (36.4 °C) (Oral) O2 sat- 100%      --  Documentation assistance provided by mela Liu for Dr. Moise MD.  Information recorded by the scribe was done at my direction and has been verified and validated by me.         Jim Liu  06/06/17 8426       Leonides Phipps MD  06/06/17 5369

## 2017-06-07 PROBLEM — R44.3 HALLUCINATION: Status: ACTIVE | Noted: 2017-06-07

## 2017-06-07 PROBLEM — R41.0 DELIRIUM: Status: ACTIVE | Noted: 2017-06-07

## 2017-06-07 LAB
ALBUMIN SERPL-MCNC: 3.5 G/DL (ref 3.5–5.2)
ALBUMIN/GLOB SERPL: 1.5 G/DL
ALP SERPL-CCNC: 85 U/L (ref 39–117)
ALT SERPL W P-5'-P-CCNC: 6 U/L (ref 1–33)
ANION GAP SERPL CALCULATED.3IONS-SCNC: 9.3 MMOL/L
AST SERPL-CCNC: 15 U/L (ref 1–32)
BASOPHILS # BLD AUTO: 0.01 10*3/MM3 (ref 0–0.2)
BASOPHILS NFR BLD AUTO: 0.2 % (ref 0–1.5)
BILIRUB SERPL-MCNC: 0.4 MG/DL (ref 0.1–1.2)
BUN BLD-MCNC: 15 MG/DL (ref 8–23)
BUN/CREAT SERPL: 16.9 (ref 7–25)
CALCIUM SPEC-SCNC: 8.7 MG/DL (ref 8.2–9.6)
CHLORIDE SERPL-SCNC: 97 MMOL/L (ref 98–107)
CO2 SERPL-SCNC: 25.7 MMOL/L (ref 22–29)
CREAT BLD-MCNC: 0.89 MG/DL (ref 0.57–1)
DEPRECATED RDW RBC AUTO: 44.2 FL (ref 37–54)
EOSINOPHIL # BLD AUTO: 0.06 10*3/MM3 (ref 0–0.7)
EOSINOPHIL NFR BLD AUTO: 1.2 % (ref 0.3–6.2)
ERYTHROCYTE [DISTWIDTH] IN BLOOD BY AUTOMATED COUNT: 13.3 % (ref 11.7–13)
GFR SERPL CREATININE-BSD FRML MDRD: 59 ML/MIN/1.73
GLOBULIN UR ELPH-MCNC: 2.3 GM/DL
GLUCOSE BLD-MCNC: 102 MG/DL (ref 65–99)
GLUCOSE BLDC GLUCOMTR-MCNC: 102 MG/DL (ref 70–130)
GLUCOSE BLDC GLUCOMTR-MCNC: 119 MG/DL (ref 70–130)
GLUCOSE BLDC GLUCOMTR-MCNC: 145 MG/DL (ref 70–130)
HBA1C MFR BLD: 5.7 % (ref 4.8–5.6)
HCT VFR BLD AUTO: 29.1 % (ref 35.6–45.5)
HGB BLD-MCNC: 9.9 G/DL (ref 11.9–15.5)
IMM GRANULOCYTES # BLD: 0 10*3/MM3 (ref 0–0.03)
IMM GRANULOCYTES NFR BLD: 0 % (ref 0–0.5)
LYMPHOCYTES # BLD AUTO: 1.59 10*3/MM3 (ref 0.9–4.8)
LYMPHOCYTES NFR BLD AUTO: 31.9 % (ref 19.6–45.3)
MCH RBC QN AUTO: 30.7 PG (ref 26.9–32)
MCHC RBC AUTO-ENTMCNC: 34 G/DL (ref 32.4–36.3)
MCV RBC AUTO: 90.4 FL (ref 80.5–98.2)
MONOCYTES # BLD AUTO: 0.79 10*3/MM3 (ref 0.2–1.2)
MONOCYTES NFR BLD AUTO: 15.8 % (ref 5–12)
NEUTROPHILS # BLD AUTO: 2.54 10*3/MM3 (ref 1.9–8.1)
NEUTROPHILS NFR BLD AUTO: 50.9 % (ref 42.7–76)
PLATELET # BLD AUTO: 181 10*3/MM3 (ref 140–500)
PMV BLD AUTO: 9.2 FL (ref 6–12)
POTASSIUM BLD-SCNC: 4.5 MMOL/L (ref 3.5–5.2)
PROT SERPL-MCNC: 5.8 G/DL (ref 6–8.5)
RBC # BLD AUTO: 3.22 10*6/MM3 (ref 3.9–5.2)
SODIUM BLD-SCNC: 132 MMOL/L (ref 136–145)
WBC NRBC COR # BLD: 4.99 10*3/MM3 (ref 4.5–10.7)

## 2017-06-07 PROCEDURE — 83036 HEMOGLOBIN GLYCOSYLATED A1C: CPT | Performed by: INTERNAL MEDICINE

## 2017-06-07 PROCEDURE — 85025 COMPLETE CBC W/AUTO DIFF WBC: CPT | Performed by: INTERNAL MEDICINE

## 2017-06-07 PROCEDURE — 25010000002 LORAZEPAM PER 2 MG: Performed by: HOSPITALIST

## 2017-06-07 PROCEDURE — 25010000002 HEPARIN (PORCINE) PER 1000 UNITS: Performed by: INTERNAL MEDICINE

## 2017-06-07 PROCEDURE — 80053 COMPREHEN METABOLIC PANEL: CPT | Performed by: INTERNAL MEDICINE

## 2017-06-07 PROCEDURE — 82962 GLUCOSE BLOOD TEST: CPT

## 2017-06-07 RX ORDER — MORPHINE SULFATE 100 MG/5ML
10 SOLUTION ORAL
Status: DISCONTINUED | OUTPATIENT
Start: 2017-06-07 | End: 2017-06-08 | Stop reason: HOSPADM

## 2017-06-07 RX ORDER — PROMETHAZINE HYDROCHLORIDE 6.25 MG/5ML
12.5 SYRUP ORAL EVERY 4 HOURS PRN
Status: DISCONTINUED | OUTPATIENT
Start: 2017-06-07 | End: 2017-06-08 | Stop reason: HOSPADM

## 2017-06-07 RX ORDER — LORAZEPAM 1 MG/1
2 TABLET ORAL
Status: DISCONTINUED | OUTPATIENT
Start: 2017-06-07 | End: 2017-06-08 | Stop reason: HOSPADM

## 2017-06-07 RX ORDER — PROMETHAZINE HYDROCHLORIDE 25 MG/ML
12.5 INJECTION, SOLUTION INTRAMUSCULAR; INTRAVENOUS EVERY 4 HOURS PRN
Status: DISCONTINUED | OUTPATIENT
Start: 2017-06-07 | End: 2017-06-08 | Stop reason: HOSPADM

## 2017-06-07 RX ORDER — MORPHINE SULFATE 100 MG/5ML
20 SOLUTION ORAL
Status: DISCONTINUED | OUTPATIENT
Start: 2017-06-07 | End: 2017-06-08 | Stop reason: HOSPADM

## 2017-06-07 RX ORDER — LORAZEPAM 2 MG/ML
0.5 CONCENTRATE ORAL
Status: DISCONTINUED | OUTPATIENT
Start: 2017-06-07 | End: 2017-06-08 | Stop reason: HOSPADM

## 2017-06-07 RX ORDER — GLYCOPYRROLATE 0.2 MG/ML
0.4 INJECTION INTRAMUSCULAR; INTRAVENOUS
Status: DISCONTINUED | OUTPATIENT
Start: 2017-06-07 | End: 2017-06-08 | Stop reason: HOSPADM

## 2017-06-07 RX ORDER — GLYCOPYRROLATE 0.2 MG/ML
0.2 INJECTION INTRAMUSCULAR; INTRAVENOUS
Status: DISCONTINUED | OUTPATIENT
Start: 2017-06-07 | End: 2017-06-08 | Stop reason: HOSPADM

## 2017-06-07 RX ORDER — LORAZEPAM 2 MG/ML
1 INJECTION INTRAMUSCULAR
Status: DISCONTINUED | OUTPATIENT
Start: 2017-06-07 | End: 2017-06-08 | Stop reason: HOSPADM

## 2017-06-07 RX ORDER — MORPHINE SULFATE 2 MG/ML
2 INJECTION, SOLUTION INTRAMUSCULAR; INTRAVENOUS
Status: DISCONTINUED | OUTPATIENT
Start: 2017-06-07 | End: 2017-06-08 | Stop reason: HOSPADM

## 2017-06-07 RX ORDER — HALOPERIDOL 1 MG/1
2 TABLET ORAL EVERY 4 HOURS PRN
Status: DISCONTINUED | OUTPATIENT
Start: 2017-06-07 | End: 2017-06-08 | Stop reason: HOSPADM

## 2017-06-07 RX ORDER — PROMETHAZINE HYDROCHLORIDE 25 MG/1
12.5 TABLET ORAL EVERY 4 HOURS PRN
Status: DISCONTINUED | OUTPATIENT
Start: 2017-06-07 | End: 2017-06-08 | Stop reason: HOSPADM

## 2017-06-07 RX ORDER — HALOPERIDOL 2 MG/ML
2 SOLUTION ORAL EVERY 4 HOURS PRN
Status: DISCONTINUED | OUTPATIENT
Start: 2017-06-07 | End: 2017-06-08 | Stop reason: HOSPADM

## 2017-06-07 RX ORDER — MORPHINE SULFATE 2 MG/ML
6 INJECTION, SOLUTION INTRAMUSCULAR; INTRAVENOUS
Status: DISCONTINUED | OUTPATIENT
Start: 2017-06-07 | End: 2017-06-08 | Stop reason: HOSPADM

## 2017-06-07 RX ORDER — LORAZEPAM 2 MG/ML
0.5 INJECTION INTRAMUSCULAR
Status: DISCONTINUED | OUTPATIENT
Start: 2017-06-07 | End: 2017-06-08 | Stop reason: HOSPADM

## 2017-06-07 RX ORDER — PROMETHAZINE HYDROCHLORIDE 12.5 MG/1
12.5 SUPPOSITORY RECTAL EVERY 4 HOURS PRN
Status: DISCONTINUED | OUTPATIENT
Start: 2017-06-07 | End: 2017-06-08 | Stop reason: HOSPADM

## 2017-06-07 RX ORDER — HALOPERIDOL 5 MG/ML
2 INJECTION INTRAMUSCULAR EVERY 4 HOURS PRN
Status: DISCONTINUED | OUTPATIENT
Start: 2017-06-07 | End: 2017-06-08 | Stop reason: HOSPADM

## 2017-06-07 RX ORDER — LORAZEPAM 0.5 MG/1
0.5 TABLET ORAL
Status: DISCONTINUED | OUTPATIENT
Start: 2017-06-07 | End: 2017-06-08 | Stop reason: HOSPADM

## 2017-06-07 RX ORDER — LORAZEPAM 2 MG/ML
1 CONCENTRATE ORAL
Status: DISCONTINUED | OUTPATIENT
Start: 2017-06-07 | End: 2017-06-08 | Stop reason: HOSPADM

## 2017-06-07 RX ORDER — LORAZEPAM 1 MG/1
1 TABLET ORAL
Status: DISCONTINUED | OUTPATIENT
Start: 2017-06-07 | End: 2017-06-08 | Stop reason: HOSPADM

## 2017-06-07 RX ORDER — MORPHINE SULFATE 100 MG/5ML
5 SOLUTION ORAL
Status: DISCONTINUED | OUTPATIENT
Start: 2017-06-07 | End: 2017-06-08 | Stop reason: HOSPADM

## 2017-06-07 RX ORDER — SCOLOPAMINE TRANSDERMAL SYSTEM 1 MG/1
1 PATCH, EXTENDED RELEASE TRANSDERMAL
Status: DISCONTINUED | OUTPATIENT
Start: 2017-06-07 | End: 2017-06-08 | Stop reason: HOSPADM

## 2017-06-07 RX ORDER — LORAZEPAM 2 MG/ML
2 CONCENTRATE ORAL
Status: DISCONTINUED | OUTPATIENT
Start: 2017-06-07 | End: 2017-06-08 | Stop reason: HOSPADM

## 2017-06-07 RX ORDER — ACETAMINOPHEN 160 MG/5ML
650 SOLUTION ORAL EVERY 4 HOURS PRN
Status: DISCONTINUED | OUTPATIENT
Start: 2017-06-07 | End: 2017-06-08 | Stop reason: HOSPADM

## 2017-06-07 RX ORDER — ACETAMINOPHEN 650 MG/1
650 SUPPOSITORY RECTAL EVERY 4 HOURS PRN
Status: DISCONTINUED | OUTPATIENT
Start: 2017-06-07 | End: 2017-06-08 | Stop reason: HOSPADM

## 2017-06-07 RX ORDER — LORAZEPAM 2 MG/ML
2 INJECTION INTRAMUSCULAR
Status: DISCONTINUED | OUTPATIENT
Start: 2017-06-07 | End: 2017-06-08 | Stop reason: HOSPADM

## 2017-06-07 RX ORDER — ACETAMINOPHEN 325 MG/1
650 TABLET ORAL EVERY 4 HOURS PRN
Status: DISCONTINUED | OUTPATIENT
Start: 2017-06-07 | End: 2017-06-08 | Stop reason: HOSPADM

## 2017-06-07 RX ORDER — DIPHENOXYLATE HYDROCHLORIDE AND ATROPINE SULFATE 2.5; .025 MG/1; MG/1
1 TABLET ORAL
Status: DISCONTINUED | OUTPATIENT
Start: 2017-06-07 | End: 2017-06-08 | Stop reason: HOSPADM

## 2017-06-07 RX ORDER — FLUCONAZOLE 2 MG/ML
200 INJECTION, SOLUTION INTRAVENOUS DAILY
Status: DISCONTINUED | OUTPATIENT
Start: 2017-06-07 | End: 2017-06-07

## 2017-06-07 RX ADMIN — GLYCOPYRROLATE 0.4 MG: 0.2 INJECTION, SOLUTION INTRAMUSCULAR; INTRAVENOUS at 22:13

## 2017-06-07 RX ADMIN — AMLODIPINE BESYLATE 5 MG: 5 TABLET ORAL at 09:11

## 2017-06-07 RX ADMIN — LORAZEPAM 1 MG: 2 INJECTION INTRAMUSCULAR; INTRAVENOUS at 23:42

## 2017-06-07 RX ADMIN — CALCIUM CARBONATE-VITAMIN D TAB 500 MG-200 UNIT 500 MG: 500-200 TAB at 09:11

## 2017-06-07 RX ADMIN — LACTULOSE 10 G: 10 SOLUTION ORAL at 09:11

## 2017-06-07 RX ADMIN — AMIODARONE HYDROCHLORIDE 100 MG: 100 TABLET ORAL at 09:11

## 2017-06-07 RX ADMIN — CETIRIZINE HYDROCHLORIDE 5 MG: 10 TABLET, FILM COATED ORAL at 09:11

## 2017-06-07 RX ADMIN — HEPARIN SODIUM 5000 UNITS: 5000 INJECTION, SOLUTION INTRAVENOUS; SUBCUTANEOUS at 09:11

## 2017-06-07 RX ADMIN — PANTOPRAZOLE SODIUM 40 MG: 40 TABLET, DELAYED RELEASE ORAL at 09:11

## 2017-06-07 RX ADMIN — ASPIRIN 81 MG: 81 TABLET ORAL at 09:11

## 2017-06-07 RX ADMIN — HEPARIN SODIUM 5000 UNITS: 5000 INJECTION, SOLUTION INTRAVENOUS; SUBCUTANEOUS at 00:01

## 2017-06-07 RX ADMIN — SODIUM CHLORIDE 100 ML/HR: 9 INJECTION, SOLUTION INTRAVENOUS at 09:11

## 2017-06-07 RX ADMIN — SODIUM CHLORIDE 100 ML/HR: 9 INJECTION, SOLUTION INTRAVENOUS at 00:01

## 2017-06-07 RX ADMIN — LORAZEPAM 1 MG: 2 INJECTION INTRAMUSCULAR; INTRAVENOUS at 18:19

## 2017-06-07 NOTE — PLAN OF CARE
Problem: Patient Care Overview (Adult)  Goal: Plan of Care Review  Outcome: Ongoing (interventions implemented as appropriate)    06/07/17 1323   Coping/Psychosocial Response Interventions   Plan Of Care Reviewed With patient   Patient Care Overview   Progress no change   Outcome Evaluation   Outcome Summary/Follow up Plan Patient is still confused and may be having hallucinations. Did not sleep well last night and has not taken a nap today. Patient does not complain of pain but keeps saying she needs to have a bowel movement- she has received lactulose this morning and no BM yet. VSS. Continue telemetry, falls precautions, sawyer precautions, aspiration precautions.       Goal: Adult Individualization and Mutuality  Outcome: Ongoing (interventions implemented as appropriate)  Goal: Discharge Needs Assessment  Outcome: Ongoing (interventions implemented as appropriate)    Problem: Fall Risk (Adult)  Goal: Absence of Falls  Outcome: Ongoing (interventions implemented as appropriate)    Problem: Confusion, Acute (Adult)  Goal: Cognitive/Functional Impairments Minimized  Outcome: Ongoing (interventions implemented as appropriate)  Goal: Safety  Outcome: Ongoing (interventions implemented as appropriate)    Problem: Pressure Ulcer Risk (Sawyer Scale) (Adult,Obstetrics,Pediatric)  Goal: Identify Related Risk Factors and Signs and Symptoms  Outcome: Outcome(s) achieved Date Met:  06/07/17  Goal: Skin Integrity  Outcome: Ongoing (interventions implemented as appropriate)    Problem: Infection, Risk/Actual (Adult)  Goal: Identify Related Risk Factors and Signs and Symptoms  Outcome: Outcome(s) achieved Date Met:  06/07/17  Goal: Infection Prevention/Resolution  Outcome: Ongoing (interventions implemented as appropriate)

## 2017-06-07 NOTE — H&P
Internal medicine history and physical   INTERNAL MEDICINE   [facility]       Patient Identification:  Name: Gertrudis Rao  Age: 89 y.o.  Sex: female  :  2/10/1923  MRN: [id number]                   Primary Care Physician: Tigre Rao Jr., MD                                   Chief Complaint:  Brought to the emergency room for progressive confusion disorientation and hallucination starting .    History of Present Illness:   Source of information patient's daughter and patient herself.    Patient is a 89-year-old female who has been living in the last nursing home for the last 6 months after being moved from assisted care facility.  She has been battling with recurrent urinary tract infection and recently had 2 episodes.  The first episode was treated with oral antibiotics about 2 weeks or 3 weeks ago.  But because of the persistent burning in urination she was started on intramuscular injection for what they describe as resistant bacterial infection in the urine.  She finished the IV antibiotics last Saturday.  And  family member was visiting her and they noticed that she was little off but not enough to be alarmed.  Yesterday she was visited by family friend and according to the family friend patient was requesting her not to tell her children that she was hallucinating.  Today nursing home called family members for her being restless and agitated crying and seeing things.  Because of that she was brought to the emergency room for further evaluation.  Patient denies any decrease in appetite but did not eat anything since her breakfast today.  She denies any fever and chills.  She denies any joint aches and pain.      Past Medical History:  Past Medical History:   Diagnosis Date   • Arthritis    • Cardiac disease    • Diabetes mellitus    • Hypertension    • Low back pain      Past Surgical History:  Past Surgical History:   Procedure Laterality Date   • BACK SURGERY     •  CHOLECYSTECTOMY  1971   • EYE SURGERY     • TONSILLECTOMY  1933   • VASCULAR SURGERY        Home Meds:  Prescriptions Prior to Admission   Medication Sig Dispense Refill Last Dose   • acetaminophen (TYLENOL) 500 MG tablet Take 500 mg by mouth Every 6 (Six) Hours As Needed for mild pain (1-3).   Unknown at Unknown time   • amiodarone (PACERONE) 200 MG tablet Take 100 mg by mouth. Every other day   Taking   • amLODIPine (NORVASC) 5 MG tablet Take 5 mg by mouth Daily.   Taking   • aspirin 81 MG EC tablet Take 81 mg by mouth Daily.   Taking   • Calcium Citrate-Vitamin D (CALCIUM + D PO) Take 1,500 mg by mouth.   Taking   • furosemide (LASIX) 40 MG tablet Take 40 mg by mouth Daily.   Taking   • gabapentin (NEURONTIN) 300 MG capsule Take 300 mg by mouth 3 (Three) Times a Day.   Taking   • HYDROcodone-acetaminophen (NORCO) 5-325 MG per tablet Take 1 tablet by mouth Every 6 (Six) Hours As Needed.   Taking   • ketorolac (TORADOL) 30 MG/ML injection Infuse  into a venous catheter.   Not Taking   • lactulose (CHRONULAC) 10 GM/15ML solution Take 10 g by mouth Daily.   Taking   • loratadine (CLARITIN) 10 MG tablet Take 10 mg by mouth Daily.   Taking   • melatonin 1 MG tablet Take 3 mg by mouth.   Taking   • meloxicam (MOBIC) 15 MG tablet Take 15 mg by mouth Daily.   Taking   • omeprazole (priLOSEC) 40 MG capsule Take 40 mg by mouth Daily.   Taking   • potassium chloride (K-DUR,KLOR-CON) 10 MEQ CR tablet Take 20 mEq by mouth Daily.   Taking   • sodium bicarbonate 650 MG tablet Take 650 mg by mouth 3 (Three) Times a Day.   Taking     Current Meds:   No current facility-administered medications for this encounter.   Allergies:  No Known Allergies  Social History:   Social History   Substance Use Topics   • Smoking status: Never Smoker   • Smokeless tobacco: Never Used   • Alcohol use No      Family History:  Family History   Problem Relation Age of Onset   • Leukemia Mother    • Cancer Father      stomach   • Cancer Sister       "pancreatic   • Cancer Brother      kidney          Review of Systems  See history of present illness and past medical history.    Unable to perform ROS: Mental status change   Neurological: Negative for weakness and numbness.   Psychiatric/Behavioral: Positive for confusion (and disorientation) and hallucinations (visual).   Vitals:   /58 (BP Location: Right arm, Patient Position: Lying)  Pulse 60  Temp 97.9 °F (36.6 °C) (Oral)   Resp 16  Ht 65\" (165.1 cm)  Wt 150 lb 8 oz (68.3 kg)  SpO2 99%  Breastfeeding? No  BMI 25.04 kg/m2  I/O: No intake or output data in the 24 hours ending 06/06/17 2203  Exam:  General Appearance:    Alert, cooperative, no distress, appears stated age, Does not appear to be toxic or septic but does appear chronically ill    Head:    Normocephalic, without obvious abnormality, atraumatic   Eyes:    PERRL, conjunctiva/corneas clear, EOM's intact, Legally blind both eyes   Ears:    Normal external ear canals, both ears   Nose:   Nares normal, septum midline, mucosa normal, no drainage    or sinus tenderness   Throat:   Lips, tongue, gums normal; oral mucosa pink and moist   Neck:   Supple, symmetrical, trachea midline, no adenopathy;     thyroid:  no enlargement/tenderness/nodules; no carotid    bruit or JVD   Back:     Symmetric, no curvature, ROM normal, no CVA tenderness   Lungs:     Bibasilar crackles    Chest Wall:    No tenderness or deformity    Heart:    Regular rate and rhythm, S1 and S2 normal, no murmur, rub   or gallop   Abdomen:     Soft, non-tender, bowel sounds active all four quadrants,     no masses, no hepatomegaly, no splenomegaly   Extremities:   Evidence of atrophy and contractures because of disuse healed scars from previous wounds noted    Pulses:   Pulses palpable in all extremities; symmetric all extremities   Skin:   Skin color normal, Skin is warm and dry,  no rashes or palpable lesions   Neurologic:   Grossly nonfocal       Data Review:      I reviewed " the patient's new clinical results.    Results from last 7 days  Lab Units 06/06/17  1547   WBC 10*3/mm3 6.16   HEMOGLOBIN g/dL 10.4*   PLATELETS 10*3/mm3 169       Results from last 7 days  Lab Units 06/06/17  1547   SODIUM mmol/L 129*   POTASSIUM mmol/L 4.8   CHLORIDE mmol/L 92*   TOTAL CO2 mmol/L 27.6   BUN mg/dL 18   CREATININE mg/dL 1.01*   CALCIUM mg/dL 9.0   GLUCOSE mg/dL 129*       Assessment:  Active Problems:    Altered mental status    Dementia    Legally blind    Hyponatremia    Dehydration    Immobility    Recurrent UTI - per history status post recent ESBL positive gram-negative francis urinary tract infection treated with IM imipenem.  Anemia    Plan:  Admit the patient, provide her with aspiration precaution, IV fluids, Accu-Cheks and sliding scale coverage, hold her Lasix and watch her serum sodium pattern.  Further management as her condition evolves.    Azra Mendieta MD   6/6/2017  10:03 PM  Much of this encounter note is an electronic transcription/translation of spoken language to printed text. The electronic translation of spoken language may permit erroneous, or at times, nonsensical words or phrases to be inadvertently transcribed; Although I have reviewed the note for such errors, some may still exist

## 2017-06-07 NOTE — PLAN OF CARE
Problem: Patient Care Overview (Adult)  Goal: Plan of Care Review  Outcome: Outcome(s) achieved Date Met:  06/07/17 06/07/17 0025   Coping/Psychosocial Response Interventions   Plan Of Care Reviewed With patient;family   Patient Care Overview   Progress no change   Outcome Evaluation   Outcome Summary/Follow up Plan Pt resting comfortably at this time, no c/o pain or discomfort at present. IVFs started and patient tolerated. VSS, will continue to monitor.        Goal: Adult Individualization and Mutuality  Outcome: Ongoing (interventions implemented as appropriate)    Problem: Fall Risk (Adult)  Goal: Identify Related Risk Factors and Signs and Symptoms  Outcome: Outcome(s) achieved Date Met:  06/07/17  Goal: Absence of Falls  Outcome: Ongoing (interventions implemented as appropriate)    Problem: Confusion, Acute (Adult)  Goal: Identify Related Risk Factors and Signs and Symptoms  Outcome: Outcome(s) achieved Date Met:  06/07/17  Goal: Cognitive/Functional Impairments Minimized  Outcome: Ongoing (interventions implemented as appropriate)  Goal: Safety  Outcome: Ongoing (interventions implemented as appropriate)

## 2017-06-07 NOTE — PROGRESS NOTES
"    DAILY PROGRESS NOTE  [facility]    Patient Identification:  Name: Gertrudis Rao  Age: 89 y.o.  Sex: female  :  2/10/1923  MRN: [id number]         Primary Care Physician: Tigre Rao Jr., MD    Subjective:  Interval History: hallucinations and delirium - unable any type of hx/ros ~20-30min spent counseling over [facility]    Objective:    Scheduled Meds:    amiodarone 100 mg Oral Q24H   amLODIPine 5 mg Oral Daily   aspirin 81 mg Oral Daily   calcium-vitamin D 500 mg Oral Daily   cetirizine 5 mg Oral Daily   fluconazole 200 mg Intravenous Daily   heparin (porcine) 5,000 Units Subcutaneous Q12H   insulin aspart 0-9 Units Subcutaneous 4x Daily With Meals & Nightly   lactulose 10 g Oral Daily   pantoprazole 40 mg Oral QAM     Continuous Infusions:    sodium chloride 100 mL/hr Last Rate: 100 mL/hr (17 1315)       Vital signs in last 24 hours:  Temp:  [97.8 °F (36.6 °C)-98.3 °F (36.8 °C)] 98 °F (36.7 °C)  Heart Rate:  [57-92] 92  Resp:  [16] 16  BP: (101-131)/(54-72) 131/63    Intake/Output:    Intake/Output Summary (Last 24 hours) at 17 1616  Last data filed at 17 1459   Gross per 24 hour   Intake              440 ml   Output             4925 ml   Net            -4485 ml       Exam:  /63 (BP Location: Right arm, Patient Position: Lying)  Pulse 92  Temp 98 °F (36.7 °C) (Oral)   Resp 16  Ht 65\" (165.1 cm)  Wt 147 lb 11.3 oz (67 kg)  SpO2 95%  Breastfeeding? No  BMI 24.58 kg/m2    General Appearance:    Hallucinating and delirious, malnourished                          Head:    Normocephalic, without obvious abnormality, atraumatic, temporal wasting                         Lungs:    Decreased bases to auscultation bilaterally, respirations unlabored                 Chest Wall:    No tenderness or deformity                          Heart:    Irregularly irregular rate and rhythm                  Abdomen:     Soft, non-tender, bowel sounds active                 " Extremities:   Extremities normal, atraumatic, no cyanosis or edema                  Neurologic:   CNII-XII seem grossly intact intact, moving all extremities but unable to fully evaluate secondary to mentation     Data Review:  Labs in chart were reviewed.    Assessment:  Active Hospital Problems (** Indicates Principal Problem)    Diagnosis Date Noted   • **Delirium [R41.0] 06/07/2017   • Hallucination [R44.3] 06/07/2017   • Dementia [F03.90] 06/06/2017   • Legally blind [H54.8] 06/06/2017   • Hyponatremia [E87.1] 06/06/2017   • Dehydration [E86.0] 06/06/2017   • Immobility [Z74.09] 06/06/2017   • Recurrent UTI [N39.0] 06/06/2017      Resolved Hospital Problems    Diagnosis Date Noted Date Resolved   No resolved problems to display.       Plan:  This is truly a sad situation.  This patient is completely delirious at this juncture and is actively hallucinating for the entire 20-30 minutes I sat at bedside counseling daughter Daisha.  I watched her try to chew the O2 sensor off her finger and have conversations with people who were not present.  She does not have an active UTI at this juncture and urine culture is growing Yeast secondary to Saini catheter as well as recent and recurrent antibiotic use.  This patient is 89 years old and clearly has designated her daughter Daisha to be healthcare surrogate.  She has had conversations with her the past that she stated she did not one a feeding tube nor chew want to prolong her life and she has even made statements to her previously that she wished she were able to pass away.  At this juncture I recommend full palliative care with transitioning goals to one of comfort as it is very hard to watch this poor lady suffer.  Unfortunately Daisha needs to discuss this further with additional family members who do not chair healthcare surrogate status but ultimately she needs to do what she needs to do.  I've asked the nurse to remove the monitor and O2 sensor for now as well  as please hold on further finger checks as I anticipate goals of care will be transitioned to one of comfort in the next couple hours pending Daisha's decision.    Addendum - Daisha decided to change GOC to comfort and full palliation. Orders implemented and transfer to     Jarred Licona MD  6/7/2017  4:16 PM

## 2017-06-07 NOTE — CONSULTS
"Adult Nutrition  Assessment/PES    Patient Name:  Gertrudis Rao  YOB: 1923  MRN: [id number]  Admit Date:  6/6/2017    Assessment Date:  6/7/2017     Comments:  Patient confused, spoke with family at bedside.  Family reports no recent weight loss, but reports gradual decline.  Says maybe around 15# weight loss x 1 year (9.4%).  Decreased PO intake over this time frame as well, nausea at times.  Reports patient has fair appetite.  Agrees to oral nutrition supplement with meals to boost protein and calorie intake.  Family agrees that this is a good idea, and says patient seems to be doing better with getting liquids down than foods.  Will continue to follow.          Reason for Assessment       06/07/17 1502    Reason for Assessment    Reason For Assessment/Visit admission assessment;identified at risk by screening criteria    Identified At Risk By Screening Criteria MST SCORE 2+;unintentional loss of 10 lbs or more in the past 2 mos    Diagnosis Diagnosis    Cardiac CAD;HTN    Endocrine DM    Neurological Dementia;Metabolic encephalopathy              Nutrition/Diet History       06/07/17 1504    Nutrition/Diet History    Typical Food/Fluid Intake dtr reports fair appetite, says has declined over past year, nausea at times    Supplemental Drinks/Foods/Additives used to drink supplements at times, but dtr does not think current NH gives them to her            Anthropometrics       06/07/17 1505    Anthropometrics    Height 165.1 cm (65\")    Weight 67 kg (147 lb 11.3 oz)    Anthropometrics (Special Considerations)    RD Calculated BMI (kg/m2) 24.46    Ideal Body Weight (IBW)    Ideal Body Weight (IBW), Female 57.69    % Ideal Body Weight 116.38    Usual Body Weight (UBW)    Usual Body Weight 72.6 kg (160 lb)   155-160# 1 year ago    % Usual Body Weight 92.32    Weight Loss 6.804 kg (15 lb)    % Weight Loss  9.4 %    Weight Loss Time Frame 1 year    Body Mass Index (BMI)    BMI (kg/m2) 24.63    BMI Grade " "19.1 - 24.9 - normal            Labs/Tests/Procedures/Meds       06/07/17 1506    Labs/Tests/Procedures/Meds    Diagnostic Test/Procedure Review reviewed, pertinent    Labs/Tests Review Reviewed;Na+;Cl-;GFR;Hgb A1C;Hgb Hct    Medication Review Reviewed, pertinent;Insulin;Antacid   Ca + Vit D    Significant Vitals reviewed, pertinent            Physical Findings       06/07/17 1507    Physical Findings/Assessment    Additional Documentation Physical Appearance (Group)   patient confused and seems to be hallucinating    Physical Appearance    Skin --   B=16, intact            Estimated/Assessed Needs       06/07/17 1508    Calculation Measurements    Weight Used For Calculations 67 kg (147 lb 11.3 oz)    Height Used for Calculations 1.651 m (5' 5\")    Estimated/Assessed Energy Needs    Energy Need Method Kcal/kg    kcal/kg 25    25 Kcal/Kg (kcal) 1675    Estimated Kcal Range  1675    Estimated/Assessed Protein Needs    Weight Used for Protein Calculation 67 kg (147 lb 11.3 oz)    Protein (gm/kg) 1.0    1.0 Gm Protein (gm) 67    Estimated Protein Range 54-67    Estimated/Assessed Fluid Needs    Fluid Need Method RDA method    RDA Method (mL)  2000            Nutrition Prescription Ordered       06/07/17 1509    Nutrition Prescription PO    Current PO Diet Regular            Evaluation of Received Nutrient/Fluid Intake       06/07/17 1509    PO Evaluation    Number of Meals 2    % PO Intake 38   25-50%              Problem/Interventions:        Problem 1       06/07/17 1510    Nutrition Diagnoses Problem 1    Problem 1 Predicted Suboptimal Intake    Etiology (related to) Medical Diagnosis;Factors Affecting Nutrition    Neurological Dementia    Appetite Fair    Mental State/Condition Confusion    Signs/Symptoms (evidenced by) Report/Observation;Unintended Weight Change    Unintended Weight Change Loss    Number of Pounds Lost 15    Weight loss time period 1 year    Reported/Observed By Family                  "   Intervention Goal       06/07/17 1511    Intervention Goal    General Maintain nutrition    PO Increase intake;PO intake (%)    PO Intake % 75 %    Weight Maintain weight            Nutrition Intervention       06/07/17 1511    Nutrition Intervention    RD/Tech Action Follow Tx progress;Care plan reviewd;Encourage intake;Recommend/ordered;Interview for preference    Recommended/Ordered Supplement            Nutrition Prescription       06/07/17 1511    Nutrition Prescription PO    PO Prescription Begin/change supplement    Supplement Glucerna Shake    Supplement Frequency 3 times a day    New PO Prescription Ordered? Yes            Education/Evaluation       06/07/17 1511    Education    Education Will Instruct as appropriate    Monitor/Evaluation    Monitor Per protocol;PO intake;Supplement intake;Weight;Symptoms          Electronically signed by:  Mindy Sunshine RD  06/07/17 3:12 PM

## 2017-06-08 ENCOUNTER — HOSPITAL ENCOUNTER (INPATIENT)
Facility: HOSPITAL | Age: 82
LOS: 5 days | Discharge: HOSPICE | End: 2017-06-13
Attending: HOSPITALIST | Admitting: INTERNAL MEDICINE

## 2017-06-08 VITALS
HEIGHT: 65 IN | BODY MASS INDEX: 24.61 KG/M2 | SYSTOLIC BLOOD PRESSURE: 133 MMHG | WEIGHT: 147.71 LBS | DIASTOLIC BLOOD PRESSURE: 66 MMHG | OXYGEN SATURATION: 89 % | HEART RATE: 100 BPM | TEMPERATURE: 99.2 F | RESPIRATION RATE: 22 BRPM

## 2017-06-08 LAB — BACTERIA SPEC AEROBE CULT: ABNORMAL

## 2017-06-08 PROCEDURE — 25010000002 HALOPERIDOL LACTATE PER 5 MG: Performed by: HOSPITALIST

## 2017-06-08 PROCEDURE — 25010000002 LORAZEPAM PER 2 MG: Performed by: HOSPITALIST

## 2017-06-08 PROCEDURE — 25010000002 MORPHINE PER 10 MG: Performed by: HOSPITALIST

## 2017-06-08 RX ORDER — LORAZEPAM 2 MG/ML
0.5 INJECTION INTRAMUSCULAR
Status: DISCONTINUED | OUTPATIENT
Start: 2017-06-08 | End: 2017-06-13 | Stop reason: HOSPADM

## 2017-06-08 RX ORDER — HALOPERIDOL 1 MG/1
2 TABLET ORAL EVERY 4 HOURS PRN
Status: DISCONTINUED | OUTPATIENT
Start: 2017-06-08 | End: 2017-06-13 | Stop reason: HOSPADM

## 2017-06-08 RX ORDER — PROMETHAZINE HYDROCHLORIDE 12.5 MG/1
12.5 SUPPOSITORY RECTAL EVERY 4 HOURS PRN
Status: DISCONTINUED | OUTPATIENT
Start: 2017-06-08 | End: 2017-06-13 | Stop reason: HOSPADM

## 2017-06-08 RX ORDER — MORPHINE SULFATE 2 MG/ML
2 INJECTION, SOLUTION INTRAMUSCULAR; INTRAVENOUS
Status: CANCELLED | OUTPATIENT
Start: 2017-06-08 | End: 2017-06-17

## 2017-06-08 RX ORDER — LORAZEPAM 2 MG/ML
1 CONCENTRATE ORAL
Status: CANCELLED | OUTPATIENT
Start: 2017-06-08 | End: 2017-06-17

## 2017-06-08 RX ORDER — LORAZEPAM 2 MG/ML
2 INJECTION INTRAMUSCULAR
Status: DISCONTINUED | OUTPATIENT
Start: 2017-06-08 | End: 2017-06-13 | Stop reason: HOSPADM

## 2017-06-08 RX ORDER — GLYCOPYRROLATE 0.2 MG/ML
0.4 INJECTION INTRAMUSCULAR; INTRAVENOUS
Status: DISCONTINUED | OUTPATIENT
Start: 2017-06-08 | End: 2017-06-13 | Stop reason: HOSPADM

## 2017-06-08 RX ORDER — GLYCOPYRROLATE 0.2 MG/ML
0.2 INJECTION INTRAMUSCULAR; INTRAVENOUS
Status: CANCELLED | OUTPATIENT
Start: 2017-06-08

## 2017-06-08 RX ORDER — ACETAMINOPHEN 650 MG/1
650 SUPPOSITORY RECTAL EVERY 4 HOURS PRN
Status: DISCONTINUED | OUTPATIENT
Start: 2017-06-08 | End: 2017-06-13 | Stop reason: HOSPADM

## 2017-06-08 RX ORDER — MORPHINE SULFATE 10 MG/ML
6 INJECTION INTRAMUSCULAR; INTRAVENOUS; SUBCUTANEOUS
Status: DISCONTINUED | OUTPATIENT
Start: 2017-06-08 | End: 2017-06-13 | Stop reason: HOSPADM

## 2017-06-08 RX ORDER — LORAZEPAM 2 MG/ML
2 INJECTION INTRAMUSCULAR
Status: CANCELLED | OUTPATIENT
Start: 2017-06-08 | End: 2017-06-17

## 2017-06-08 RX ORDER — ACETAMINOPHEN 160 MG/5ML
650 SOLUTION ORAL EVERY 4 HOURS PRN
Status: CANCELLED | OUTPATIENT
Start: 2017-06-08

## 2017-06-08 RX ORDER — LORAZEPAM 1 MG/1
1 TABLET ORAL
Status: DISCONTINUED | OUTPATIENT
Start: 2017-06-08 | End: 2017-06-13 | Stop reason: HOSPADM

## 2017-06-08 RX ORDER — LORAZEPAM 2 MG/ML
2 CONCENTRATE ORAL
Status: CANCELLED | OUTPATIENT
Start: 2017-06-08 | End: 2017-06-17

## 2017-06-08 RX ORDER — PROMETHAZINE HYDROCHLORIDE 25 MG/1
12.5 TABLET ORAL EVERY 4 HOURS PRN
Status: DISCONTINUED | OUTPATIENT
Start: 2017-06-08 | End: 2017-06-13 | Stop reason: HOSPADM

## 2017-06-08 RX ORDER — LORAZEPAM 2 MG/ML
2 CONCENTRATE ORAL
Status: DISCONTINUED | OUTPATIENT
Start: 2017-06-08 | End: 2017-06-13 | Stop reason: HOSPADM

## 2017-06-08 RX ORDER — LORAZEPAM 2 MG/ML
1 INJECTION INTRAMUSCULAR
Status: CANCELLED | OUTPATIENT
Start: 2017-06-08 | End: 2017-06-17

## 2017-06-08 RX ORDER — SCOLOPAMINE TRANSDERMAL SYSTEM 1 MG/1
1 PATCH, EXTENDED RELEASE TRANSDERMAL
Status: CANCELLED | OUTPATIENT
Start: 2017-06-08

## 2017-06-08 RX ORDER — MORPHINE SULFATE 100 MG/5ML
5 SOLUTION ORAL
Status: DISCONTINUED | OUTPATIENT
Start: 2017-06-08 | End: 2017-06-13 | Stop reason: HOSPADM

## 2017-06-08 RX ORDER — GLYCOPYRROLATE 0.2 MG/ML
0.4 INJECTION INTRAMUSCULAR; INTRAVENOUS
Status: CANCELLED | OUTPATIENT
Start: 2017-06-08

## 2017-06-08 RX ORDER — MORPHINE SULFATE 10 MG/ML
6 INJECTION INTRAMUSCULAR; INTRAVENOUS; SUBCUTANEOUS
Status: CANCELLED | OUTPATIENT
Start: 2017-06-08 | End: 2017-06-17

## 2017-06-08 RX ORDER — PROMETHAZINE HYDROCHLORIDE 25 MG/ML
12.5 INJECTION, SOLUTION INTRAMUSCULAR; INTRAVENOUS EVERY 4 HOURS PRN
Status: CANCELLED | OUTPATIENT
Start: 2017-06-08

## 2017-06-08 RX ORDER — LORAZEPAM 2 MG/ML
0.5 CONCENTRATE ORAL
Status: DISCONTINUED | OUTPATIENT
Start: 2017-06-08 | End: 2017-06-13 | Stop reason: HOSPADM

## 2017-06-08 RX ORDER — LORAZEPAM 1 MG/1
2 TABLET ORAL
Status: CANCELLED | OUTPATIENT
Start: 2017-06-08 | End: 2017-06-17

## 2017-06-08 RX ORDER — HALOPERIDOL 1 MG/1
2 TABLET ORAL EVERY 4 HOURS PRN
Status: CANCELLED | OUTPATIENT
Start: 2017-06-08

## 2017-06-08 RX ORDER — GLYCOPYRROLATE 0.2 MG/ML
0.2 INJECTION INTRAMUSCULAR; INTRAVENOUS
Status: DISCONTINUED | OUTPATIENT
Start: 2017-06-08 | End: 2017-06-13 | Stop reason: HOSPADM

## 2017-06-08 RX ORDER — LORAZEPAM 2 MG/ML
1 CONCENTRATE ORAL
Status: DISCONTINUED | OUTPATIENT
Start: 2017-06-08 | End: 2017-06-13 | Stop reason: HOSPADM

## 2017-06-08 RX ORDER — LORAZEPAM 0.5 MG/1
0.5 TABLET ORAL
Status: CANCELLED | OUTPATIENT
Start: 2017-06-08 | End: 2017-06-17

## 2017-06-08 RX ORDER — PROMETHAZINE HYDROCHLORIDE 12.5 MG/1
12.5 SUPPOSITORY RECTAL EVERY 4 HOURS PRN
Status: CANCELLED | OUTPATIENT
Start: 2017-06-08

## 2017-06-08 RX ORDER — MORPHINE SULFATE 100 MG/5ML
20 SOLUTION ORAL
Status: DISCONTINUED | OUTPATIENT
Start: 2017-06-08 | End: 2017-06-13 | Stop reason: HOSPADM

## 2017-06-08 RX ORDER — PROMETHAZINE HYDROCHLORIDE 6.25 MG/5ML
12.5 SYRUP ORAL EVERY 4 HOURS PRN
Status: CANCELLED | OUTPATIENT
Start: 2017-06-08

## 2017-06-08 RX ORDER — HALOPERIDOL 5 MG/ML
2 INJECTION INTRAMUSCULAR EVERY 4 HOURS PRN
Status: DISCONTINUED | OUTPATIENT
Start: 2017-06-08 | End: 2017-06-13 | Stop reason: HOSPADM

## 2017-06-08 RX ORDER — MORPHINE SULFATE 100 MG/5ML
10 SOLUTION ORAL
Status: DISCONTINUED | OUTPATIENT
Start: 2017-06-08 | End: 2017-06-13 | Stop reason: HOSPADM

## 2017-06-08 RX ORDER — PROMETHAZINE HYDROCHLORIDE 6.25 MG/5ML
12.5 SYRUP ORAL EVERY 4 HOURS PRN
Status: DISCONTINUED | OUTPATIENT
Start: 2017-06-08 | End: 2017-06-13 | Stop reason: HOSPADM

## 2017-06-08 RX ORDER — LORAZEPAM 2 MG/ML
1 INJECTION INTRAMUSCULAR
Status: DISCONTINUED | OUTPATIENT
Start: 2017-06-08 | End: 2017-06-13 | Stop reason: HOSPADM

## 2017-06-08 RX ORDER — DIPHENOXYLATE HYDROCHLORIDE AND ATROPINE SULFATE 2.5; .025 MG/1; MG/1
1 TABLET ORAL
Status: CANCELLED | OUTPATIENT
Start: 2017-06-08

## 2017-06-08 RX ORDER — LORAZEPAM 2 MG/ML
0.5 INJECTION INTRAMUSCULAR
Status: CANCELLED | OUTPATIENT
Start: 2017-06-08 | End: 2017-06-17

## 2017-06-08 RX ORDER — PROMETHAZINE HYDROCHLORIDE 25 MG/ML
12.5 INJECTION, SOLUTION INTRAMUSCULAR; INTRAVENOUS EVERY 4 HOURS PRN
Status: DISCONTINUED | OUTPATIENT
Start: 2017-06-08 | End: 2017-06-13 | Stop reason: HOSPADM

## 2017-06-08 RX ORDER — MORPHINE SULFATE 2 MG/ML
2 INJECTION, SOLUTION INTRAMUSCULAR; INTRAVENOUS
Status: DISCONTINUED | OUTPATIENT
Start: 2017-06-08 | End: 2017-06-13 | Stop reason: HOSPADM

## 2017-06-08 RX ORDER — MORPHINE SULFATE 100 MG/5ML
20 SOLUTION ORAL
Status: CANCELLED | OUTPATIENT
Start: 2017-06-08 | End: 2017-06-17

## 2017-06-08 RX ORDER — LORAZEPAM 1 MG/1
1 TABLET ORAL
Status: CANCELLED | OUTPATIENT
Start: 2017-06-08 | End: 2017-06-17

## 2017-06-08 RX ORDER — ACETAMINOPHEN 325 MG/1
650 TABLET ORAL EVERY 4 HOURS PRN
Status: DISCONTINUED | OUTPATIENT
Start: 2017-06-08 | End: 2017-06-13 | Stop reason: HOSPADM

## 2017-06-08 RX ORDER — ACETAMINOPHEN 160 MG/5ML
650 SOLUTION ORAL EVERY 4 HOURS PRN
Status: DISCONTINUED | OUTPATIENT
Start: 2017-06-08 | End: 2017-06-13 | Stop reason: HOSPADM

## 2017-06-08 RX ORDER — HALOPERIDOL 2 MG/ML
2 SOLUTION ORAL EVERY 4 HOURS PRN
Status: DISCONTINUED | OUTPATIENT
Start: 2017-06-08 | End: 2017-06-13 | Stop reason: HOSPADM

## 2017-06-08 RX ORDER — LORAZEPAM 2 MG/ML
0.5 CONCENTRATE ORAL
Status: CANCELLED | OUTPATIENT
Start: 2017-06-08 | End: 2017-06-17

## 2017-06-08 RX ORDER — LORAZEPAM 1 MG/1
2 TABLET ORAL
Status: DISCONTINUED | OUTPATIENT
Start: 2017-06-08 | End: 2017-06-13 | Stop reason: HOSPADM

## 2017-06-08 RX ORDER — ACETAMINOPHEN 325 MG/1
650 TABLET ORAL EVERY 4 HOURS PRN
Status: CANCELLED | OUTPATIENT
Start: 2017-06-08

## 2017-06-08 RX ORDER — HALOPERIDOL 2 MG/ML
2 SOLUTION ORAL EVERY 4 HOURS PRN
Status: CANCELLED | OUTPATIENT
Start: 2017-06-08

## 2017-06-08 RX ORDER — HALOPERIDOL 5 MG/ML
2 INJECTION INTRAMUSCULAR EVERY 4 HOURS PRN
Status: CANCELLED | OUTPATIENT
Start: 2017-06-08

## 2017-06-08 RX ORDER — MORPHINE SULFATE 100 MG/5ML
10 SOLUTION ORAL
Status: CANCELLED | OUTPATIENT
Start: 2017-06-08 | End: 2017-06-17

## 2017-06-08 RX ORDER — LORAZEPAM 0.5 MG/1
0.5 TABLET ORAL
Status: DISCONTINUED | OUTPATIENT
Start: 2017-06-08 | End: 2017-06-13 | Stop reason: HOSPADM

## 2017-06-08 RX ORDER — DIPHENOXYLATE HYDROCHLORIDE AND ATROPINE SULFATE 2.5; .025 MG/1; MG/1
1 TABLET ORAL
Status: DISCONTINUED | OUTPATIENT
Start: 2017-06-08 | End: 2017-06-13 | Stop reason: HOSPADM

## 2017-06-08 RX ORDER — ACETAMINOPHEN 650 MG/1
650 SUPPOSITORY RECTAL EVERY 4 HOURS PRN
Status: CANCELLED | OUTPATIENT
Start: 2017-06-08

## 2017-06-08 RX ORDER — SODIUM CHLORIDE 9 MG/ML
50 INJECTION, SOLUTION INTRAVENOUS CONTINUOUS
Status: DISCONTINUED | OUTPATIENT
Start: 2017-06-08 | End: 2017-06-12

## 2017-06-08 RX ORDER — PROMETHAZINE HYDROCHLORIDE 25 MG/1
12.5 TABLET ORAL EVERY 4 HOURS PRN
Status: CANCELLED | OUTPATIENT
Start: 2017-06-08

## 2017-06-08 RX ORDER — SCOLOPAMINE TRANSDERMAL SYSTEM 1 MG/1
1 PATCH, EXTENDED RELEASE TRANSDERMAL
Status: DISCONTINUED | OUTPATIENT
Start: 2017-06-08 | End: 2017-06-13 | Stop reason: HOSPADM

## 2017-06-08 RX ORDER — MORPHINE SULFATE 100 MG/5ML
5 SOLUTION ORAL
Status: CANCELLED | OUTPATIENT
Start: 2017-06-08 | End: 2017-06-17

## 2017-06-08 RX ADMIN — HALOPERIDOL LACTATE 2 MG: 5 INJECTION, SOLUTION INTRAMUSCULAR at 10:35

## 2017-06-08 RX ADMIN — LORAZEPAM 1 MG: 2 INJECTION INTRAMUSCULAR; INTRAVENOUS at 12:52

## 2017-06-08 RX ADMIN — LORAZEPAM 1 MG: 2 INJECTION INTRAMUSCULAR; INTRAVENOUS at 16:11

## 2017-06-08 RX ADMIN — SODIUM CHLORIDE 75 ML/HR: 9 INJECTION, SOLUTION INTRAVENOUS at 23:30

## 2017-06-08 RX ADMIN — LORAZEPAM 1 MG: 2 INJECTION INTRAMUSCULAR; INTRAVENOUS at 09:07

## 2017-06-08 RX ADMIN — MORPHINE SULFATE 2 MG: 2 INJECTION, SOLUTION INTRAMUSCULAR; INTRAVENOUS at 12:52

## 2017-06-08 RX ADMIN — MORPHINE SULFATE 2 MG: 2 INJECTION, SOLUTION INTRAMUSCULAR; INTRAVENOUS at 16:11

## 2017-06-08 RX ADMIN — LORAZEPAM 1 MG: 2 INJECTION INTRAMUSCULAR; INTRAVENOUS at 00:50

## 2017-06-08 NOTE — PROGRESS NOTES
Continued Stay Note  [facility]     Patient Name: Gertrudis Rao  MRN: [id number]  Today's Date: 6/8/2017    Admit Date: 6/6/2017          Discharge Plan       06/08/17 1018    Case Management/Social Work Plan    Plan -    Additional Comments Recieved call back from Aleyda at [facility], they do have skilled nursing at facility. Pt is from  level of care with medicaid bed hold, and can return. CCP explained pt had been transferred to  and gave Mitzy RN/CCP  contact info. Michelle DANIEL/CCP              Discharge Codes     None            Ann Luciano, RN

## 2017-06-08 NOTE — CONSULTS
[facility] met with the [facility] daughter and explained [facility] services. The daughter is agreeable to services. This representative updated [facility] nurse. Thank you for referral please call [phone] with any further questions.

## 2017-06-08 NOTE — PLAN OF CARE
Problem: Dying Patient, Actively (Adult)  Goal: Identify Related Risk Factors and Signs and Symptoms  Outcome: Ongoing (interventions implemented as appropriate)  Goal: Comfort/Pain Control  Outcome: Ongoing (interventions implemented as appropriate)  Goal: Dying Process, Peace and Dignity  Outcome: Ongoing (interventions implemented as appropriate)    Problem: Fall Risk (Adult)  Goal: Absence of Falls  Outcome: Ongoing (interventions implemented as appropriate)    Problem: Patient Care Overview (Adult)  Goal: Plan of Care Review  Outcome: Ongoing (interventions implemented as appropriate)    06/08/17 1901   Coping/Psychosocial Response Interventions   Plan Of Care Reviewed With patient   Patient Care Overview   Progress no change   Outcome Evaluation   Outcome Summary/Follow up Plan Pt & family met with hospice today & became a HSB, plan to admin PRN meds Q2-3H as premedications for turns & provide comfort       Goal: Adult Individualization and Mutuality  Outcome: Ongoing (interventions implemented as appropriate)  Goal: Discharge Needs Assessment  Outcome: Ongoing (interventions implemented as appropriate)

## 2017-06-08 NOTE — PROGRESS NOTES
"    DAILY PROGRESS NOTE  [facility]    Patient Identification:  Name: Gertrudis Rao  Age: 89 y.o.  Sex: female  :  2/10/1923  MRN: [id number]         Primary Care Physician: Tigre Rao Jr., MD    Subjective:  Interval History: rested well overnight per d/w rn - treated this am but since daughter arrived some agitation and c/o abdominal pains have been conveyed. Unable hx/ros     Objective:    Scheduled Meds:   Continuous Infusions:     Vital signs in last 24 hours:  Temp:  [98 °F (36.7 °C)-99.2 °F (37.3 °C)] 99.2 °F (37.3 °C)  Heart Rate:  [] 100  Resp:  [16-22] 22  BP: (131-133)/(63-66) 133/66    Intake/Output:    Intake/Output Summary (Last 24 hours) at 17 1240  Last data filed at 17 0843   Gross per 24 hour   Intake          1891.67 ml   Output             2850 ml   Net          -958.33 ml       Exam:  /66 (BP Location: Left arm, Patient Position: Lying)  Pulse 100  Temp 99.2 °F (37.3 °C) (Oral)   Resp 22  Ht 65\" (165.1 cm)  Wt 147 lb 11.3 oz (67 kg)  SpO2 (!) 89%  Breastfeeding? No  BMI 24.58 kg/m2    General Appearance:   resting, no distress but awakes at times w/ persistent delirium made worse by daughter at bedside asking her ?'s - counseled                         Lungs:    Clear to auscultation bilaterally, respirations unlabored                 Chest Wall:    No tenderness or deformity                          Heart:    Regular rate and rhythm, S1 and S2 normal                  Abdomen:     Soft, non-tender, bowel sounds active                    Data Review:  Labs in chart were reviewed.    Assessment:  Active Hospital Problems (** Indicates Principal Problem)    Diagnosis Date Noted   • **Delirium [R41.0] 2017   • Hallucination [R44.3] 2017   • Dementia [F03.90] 2017   • Legally blind [H54.8] 2017   • Hyponatremia [E87.1] 2017   • Dehydration [E86.0] 2017   • Immobility [Z74.09] 2017   • Recurrent UTI " [N39.0] 06/06/2017      Resolved Hospital Problems    Diagnosis Date Noted Date Resolved   No resolved problems to display.       Plan:  Consult Hospice for HSB - if obtained will flip     - d/w RN and suggest increasing frequency of prn meds to a q2-3 basis trying to be more proactive as opposed to reactive     Jarred Licona MD  6/8/2017  12:40 PM

## 2017-06-08 NOTE — PLAN OF CARE
Problem: Patient Care Overview (Adult)  Goal: Plan of Care Review  Outcome: Ongoing (interventions implemented as appropriate)  Continue symptom management and comfort care.    06/07/17 1323 06/08/17 0004 06/08/17 0447   Coping/Psychosocial Response Interventions   Plan Of Care Reviewed With --  patient;family --    Patient Care Overview   Progress no change --  --    Outcome Evaluation   Outcome Summary/Follow up Plan --  --  Patient arrived on unit at about 0200. Ativan had been given prior to transport. Patient rested comfortably with her sons at bedside.       Goal: Adult Individualization and Mutuality  Outcome: Ongoing (interventions implemented as appropriate)  Goal: Discharge Needs Assessment  Outcome: Ongoing (interventions implemented as appropriate)    Problem: Fall Risk (Adult)  Goal: Absence of Falls  Outcome: Ongoing (interventions implemented as appropriate)    Problem: Confusion, Acute (Adult)  Goal: Cognitive/Functional Impairments Minimized  Outcome: Ongoing (interventions implemented as appropriate)  Goal: Safety  Outcome: Ongoing (interventions implemented as appropriate)    Problem: Dying Patient, Actively (Adult)  Goal: Identify Related Risk Factors and Signs and Symptoms  Outcome: Ongoing (interventions implemented as appropriate)  Goal: Comfort/Pain Control  Outcome: Ongoing (interventions implemented as appropriate)  Goal: Dying Process, Peace and Dignity  Outcome: Ongoing (interventions implemented as appropriate)

## 2017-06-08 NOTE — DISCHARGE SUMMARY
DATE OF ADMISSION:  06/06/2017  DATE OF DISCHARGE:  06/08/2017    CONSULTANT:  Hospice.    DISCHARGE DIAGNOSES:  1.  Terminal dementia with delirium and hallucination.   2.  Hyponatremia.   3.  Dehydration.   4.  Recurrent urinary tract infection, now growing yeast.   5.  Immobility.     HOSPITAL COURSE:  The patient is a 89-year-old female, initially admitted by my colleague Dr. Mendieta, for progressive confusion and disorientation, as well as hallucination. See the history and physical for further admission diagnosis. She was initially blanketed with ertapenem, secondary to concerns of multidrug-resistant UTI. Urine culture here ended up growing yeast. When I rounded on the patient on the day we changed goals of care to one of palliative, the patient's agitation and hallucinations were quite pronounced. I sat and had an extensive conversation with the daughter who was healthcare surrogate and basically the whole time I sat there and talked, the patient had conversations with people who were not in the room. Her delirium was quite pronounced as well as her hallucinations, and given her past history of dementia and no actual source for an active infection other than the yeast UTI secondary to previously indwelling Saini catheter as well as recurrent use of antibiotics, I had a long discussion with the daughter at that juncture to consider switching goals of care to one of comfort. Ultimately she wanted to meet with her family and discuss this further with her siblings, but I received a call later that evening that she would like to transition the patient over to more of a comfort care regimen. I transferred the patient [facility] to [facility] and since then we have implemented the palliative care order set, and as of today I felt she much better in regards to her agitation, delirium, and encephalopathy, but I felt we still had room to improve; so I discussed this further with the nurse in regards to increasing frequency of  her palliative care order set, as well as getting more aggressive with morphine; as she was starting to have complaints of abdominal discomfort due to her Saini catheter. Ultimately, prognosis is obviously quite poor and hospice is meeting with the patient today.     DISPOSITION:  We will transition over to hospice scattered bed, if granted per hospice.     FOLLOW UP:  [facility] will continue to manage postdischarge.     DISCHARGE MEDICATIONS:  We will continue palliative care order set.     Greater than 30 minutes spent organizing discharge.       Jarred Licona MD  VO:ms  D:   06/08/2017 16:09:40  T:   06/08/2017 16:46:39  Job ID:   [id number]  Document ID:   [id number]  cc:

## 2017-06-09 PROBLEM — E86.0 DEHYDRATION: Status: RESOLVED | Noted: 2017-06-06 | Resolved: 2017-06-09

## 2017-06-09 PROBLEM — G31.1 SENILE DEGENERATION OF BRAIN (HCC): Status: ACTIVE | Noted: 2017-06-09

## 2017-06-09 PROBLEM — F03.918 SENILE DEMENTIA WITH BEHAVIORAL DISTURBANCE (HCC): Status: ACTIVE | Noted: 2017-06-09

## 2017-06-09 PROBLEM — Z51.5 HOSPICE CARE PATIENT: Status: ACTIVE | Noted: 2017-06-09

## 2017-06-09 PROBLEM — Z98.890 H/O KYPHOPLASTY: Status: ACTIVE | Noted: 2017-06-09

## 2017-06-09 PROBLEM — B37.49 CANDIDAL URINARY TRACT INFECTION: Status: ACTIVE | Noted: 2017-06-09

## 2017-06-09 PROCEDURE — 25010000002 LORAZEPAM PER 2 MG: Performed by: HOSPITALIST

## 2017-06-09 PROCEDURE — 25010000002 MORPHINE PER 10 MG: Performed by: HOSPITALIST

## 2017-06-09 PROCEDURE — 99222 1ST HOSP IP/OBS MODERATE 55: CPT | Performed by: INTERNAL MEDICINE

## 2017-06-09 RX ADMIN — SODIUM CHLORIDE 50 ML/HR: 9 INJECTION, SOLUTION INTRAVENOUS at 13:12

## 2017-06-09 RX ADMIN — MORPHINE SULFATE 2 MG: 2 INJECTION, SOLUTION INTRAMUSCULAR; INTRAVENOUS at 13:52

## 2017-06-09 RX ADMIN — MORPHINE SULFATE 2 MG: 2 INJECTION, SOLUTION INTRAMUSCULAR; INTRAVENOUS at 23:57

## 2017-06-09 RX ADMIN — LORAZEPAM 1 MG: 2 INJECTION INTRAMUSCULAR; INTRAVENOUS at 18:38

## 2017-06-09 RX ADMIN — MORPHINE SULFATE 2 MG: 2 INJECTION, SOLUTION INTRAMUSCULAR; INTRAVENOUS at 18:38

## 2017-06-09 RX ADMIN — LORAZEPAM 1 MG: 2 INJECTION INTRAMUSCULAR; INTRAVENOUS at 23:57

## 2017-06-09 RX ADMIN — LORAZEPAM 1 MG: 2 INJECTION INTRAMUSCULAR; INTRAVENOUS at 13:52

## 2017-06-09 NOTE — PROGRESS NOTES
Continued Stay Note  [facility]     Patient Name: Gertrudis Rao  MRN: [id number]  Today's Date: 6/9/2017    Admit Date: 6/8/2017          Discharge Plan       06/09/17 0935    Case Management/Social Work Plan    Additional Comments The patient was made a [facility] scattered bed effective 6/8/17.  JUANA Snowden              Discharge Codes     None            Rebecca Lee

## 2017-06-09 NOTE — PLAN OF CARE
Problem: Dying Patient, Actively (Adult)  Goal: Comfort/Pain Control  Outcome: Ongoing (interventions implemented as appropriate)  Goal: Dying Process, Peace and Dignity  Outcome: Ongoing (interventions implemented as appropriate)    Problem: Fall Risk (Adult)  Goal: Absence of Falls  Outcome: Ongoing (interventions implemented as appropriate)    Problem: Patient Care Overview (Adult)  Goal: Plan of Care Review  Outcome: Ongoing (interventions implemented as appropriate)    06/09/17 0858   Coping/Psychosocial Response Interventions   Plan Of Care Reviewed With patient;daughter;family   Patient Care Overview   Progress no change   Outcome Evaluation   Outcome Summary/Follow up Plan Pt more alert today, awake & talking most of the day, family stays at bedside, PRN meds admin x 1, pt denies pain, continue to monitor, pt's sons will try to be here for rounds c/  tmrw

## 2017-06-09 NOTE — PLAN OF CARE
"Problem: Dying Patient, Actively (Adult)  Goal: Identify Related Risk Factors and Signs and Symptoms  Outcome: Outcome(s) achieved Date Met:  06/09/17  Goal: Comfort/Pain Control  Outcome: Ongoing (interventions implemented as appropriate)  Goal: Dying Process, Peace and Dignity  Outcome: Ongoing (interventions implemented as appropriate)    Problem: Fall Risk (Adult)  Goal: Absence of Falls  Outcome: Ongoing (interventions implemented as appropriate)    Problem: Patient Care Overview (Adult)  Goal: Plan of Care Review  Outcome: Ongoing (interventions implemented as appropriate)    06/09/17 0522   Coping/Psychosocial Response Interventions   Plan Of Care Reviewed With patient;family;durable power of    Patient Care Overview   Progress no change   Outcome Evaluation   Outcome Summary/Follow up Plan Pt minimally responsive, pt able to whisper one or two words, does not follow commands. Family at [facility] throughout shift. Family at d expressed wishes to start IVF, \"want her to have a chance to improve\", Family offered comfort, support, and education r/t pts condition and prognosis. Daisha consulted for IVF and was agreeable, MD notified of family wishes and order for flds obtained. Pt has been w/o sx of pain/discomfort, no PRN meds required this shift.  ADDENDUM:  At 0550 during repositioning, pt awake, alert and oriented to self and place, pt requesting food, jello fed to pt by family. Pt conversing appropriately, denies c/o pain/discomfort.   Continue to monitor and tx per POC and MD orders.        Goal: Adult Individualization and Mutuality  Outcome: Outcome(s) achieved Date Met:  06/09/17  Goal: Discharge Needs Assessment  Outcome: Outcome(s) achieved Date Met:  06/09/17      "

## 2017-06-09 NOTE — PROGRESS NOTES
Continued Stay Note  [facility]     Patient Name: Gertrudis Rao  MRN: [id number]  Today's Date: 6/9/2017    Admit Date: 6/6/2017          Discharge Plan       06/09/17 1513    Case Management/Social Work Plan    [facility]    Final Note    Final Note  Discharged/transferred to a [facility] based medicare approved swing bed              Discharge Codes       06/09/17 1513    Discharge Codes    Discharge Codes 61  Discharged/transferred to a hospital based medicare approved swing bed        Expected Discharge Date and Time     Expected Discharge Date Expected Discharge Time    Jun 8, 2017  5:06 PM            Jennifer Milton RN

## 2017-06-09 NOTE — H&P
Palliative Care/Hospice Admit/Consult Note 6/9/2017      Referring Provider: Rubén Zuñiga M.D.  Reason for Consultation: Hospice care  Date of Admission:  6/8/2017    Patient Care Team:  Tigre Rao Jr., MD as PCP - General (Geriatric Medicine)    Chief complaint:  Dementia    History of present illness:  The patient is a 89 y.o. female admitted for progressive confusion and disorientation, as well as hallucination. She was initially broadly covered with ertapenem, secondary to concerns of multidrug-resistant UTI. Urine culture ended up growing Candida.  After discussion with the family, goals of care were changed to  palliative, the patient's agitation and hallucinations were quite pronounced.  Dr. Jarred Licona had an extensive conversation with the daughter who was healthcare surrogate and basically the whole time I sat there and talked, the patient had conversations with people who were not in the room. Her delirium was quite pronounced as well as her hallucinations, and given her past history of dementia and no actual source for an active infection other than the yeast UTI secondary to previously indwelling Saini catheter as well as recurrent use of antibiotics. Ultimately she wanted to meet with her family and discuss this further with her siblings, but I received a call later that evening that she would like to transition the patient over to more of a comfort care regimen. The patient was transferred to [facility] and implemented the palliative care order set; with treatment the patient appeared better in regards to her agitation, delirium, and encephalopathy, but I felt we still had room to improve; so I discussed this further with the nurse in regards to increasing frequency of her palliative care order set, as well as getting more aggressive with morphine; as she was starting to have complaints of abdominal discomfort due to her Saini catheter.  [facility] met with the patient's family and the patient was  admitted as a hospice scattered bed.  Subsequently, I was asked to assume the patient's care.    At the time of my evaluation, the patient was awakened.  She denied any shortness of breath or pain or chest pain or GI complaints.  She did complain of a sore backside.    Review of Systems  Pertinent items are noted in HPI    Palliative Performance Scale  Palliative Performance Scale Score: 30%    History  Past Medical History:   Diagnosis Date   • Arthritis    • Cardiac disease    • Diabetes mellitus    • Hypertension    • Low back pain    ,   Past Surgical History:   Procedure Laterality Date   • BACK SURGERY     • CHOLECYSTECTOMY  1971   • EYE SURGERY     • TONSILLECTOMY  1933   • VASCULAR SURGERY     ,   Family History   Problem Relation Age of Onset   • Leukemia Mother    • Cancer Father      stomach   • Cancer Sister      pancreatic   • Cancer Brother      kidney    and   Social History   Substance Use Topics   • Smoking status: Never Smoker   • Smokeless tobacco: Never Used   • Alcohol use No       Vital Signs   Temp:  [97.5 °F (36.4 °C)-97.9 °F (36.6 °C)] 97.9 °F (36.6 °C)  Heart Rate:  [49-71] 71  Resp:  [16] 16  BP: (110-125)/(47-61) 125/61  O2 Device: room air SpO2:  [98 %] 98 %    Physical Exam:     General Appearance:    Awake and appears in no acute distress   Head:    Normocephalic, without obvious abnormality, atraumatic   Eyes:            Lids and lashes normal, conjunctivae and sclerae normal, no   icterus   Ears:    Ears appear intact with no abnormalities noted   Throat:   No oral lesions, oral mucosa moist   Neck:   No adenopathy, supple, trachea midline, no thyromegaly   Back:     No obvious scoliosis present   Lungs:     Clear to auscultation, respirations regular, even and                   unlabored    Heart:    Regular rhythm and normal rate   Breast Exam:    Deferred   Abdomen:     Normal bowel sounds, no masses, no organomegaly, soft        non-tender, non-distended, no guarding, no rebound                  tenderness   Genitalia:    Deferred   Extremities:   No edema, no cyanosis   Pulses:   Radial pulses palpable and equal bilaterally   Skin:   No bleeding       Neurologic:   Awake and confused to treatment coarse and diagnosis and how long she has been in the hospital       Results Review:   I reviewed the patient's new clinical results.    CT Head 6/6/2017:  IMPRESSION:  No significant change when compared to prior head CT from  [facility] 10/01/2015. There is mild small vessel disease  in cerebral white matter, calcified atherosclerotic plaques in the  distal left vertebral artery and cavernous segments of internal carotid  arteries bilaterally and degenerative changes in the temporomandibular  joints bilaterally right greater than left. The remainder of the head CT  is within normal limits and the etiology of the altered mental status  and visual hallucinations is not established on this exam.    Impression:  Principal Problem:    Senile dementia with behavioral disturbance  Active Problems:    Hospice care patient    Candidal urinary tract infection    Legally blind    H/O kyphoplasty        Plan:  I reviewed the patient with Dr. Rubén Zuñiga.  No family was in the room at the time of my evaluation.  The patient was awakened.  She has short-term awareness of her present condition.  She is receiving IV fluids.  No treatment of symptoms yet needed.  The patient has been admitted as a hospice scattered bed.  Depending on the patient's condition, disposition may need to be determined over the next 72 hours.      Chetan Anderson MD  06/09/17  10:06 AM    Addendum:  Talked with daughter (POA) who is very aware of the patient's situation.  She is concerned about her siblings who wish the IVFs.  I told her that swelling and congestion could occur.  She is happy that her mom is better in some ways - calmer.  I told her it is usually short-lived.  I also reinforced that symptoms are  treated only.  If her mom is calm, no meds given.  I also encouraged and she agreed that the IVFs be decreased to 50 ml/hr.  I told her I plan on rounding between 10 and noon tomorrow and if any siblings wish to be there, i would be happy to discuss all with them.    Chetan Anderson MD  06/09/2017  1051

## 2017-06-10 PROCEDURE — 25010000002 MORPHINE PER 10 MG: Performed by: HOSPITALIST

## 2017-06-10 PROCEDURE — 99232 SBSQ HOSP IP/OBS MODERATE 35: CPT | Performed by: INTERNAL MEDICINE

## 2017-06-10 PROCEDURE — 25010000002 LORAZEPAM PER 2 MG: Performed by: HOSPITALIST

## 2017-06-10 RX ADMIN — LORAZEPAM 2 MG: 2 INJECTION INTRAMUSCULAR; INTRAVENOUS at 21:26

## 2017-06-10 RX ADMIN — MORPHINE SULFATE 2 MG: 2 INJECTION, SOLUTION INTRAMUSCULAR; INTRAVENOUS at 17:29

## 2017-06-10 RX ADMIN — LORAZEPAM 1 MG: 2 INJECTION INTRAMUSCULAR; INTRAVENOUS at 10:41

## 2017-06-10 RX ADMIN — LORAZEPAM 1 MG: 2 INJECTION INTRAMUSCULAR; INTRAVENOUS at 17:29

## 2017-06-10 RX ADMIN — SODIUM CHLORIDE 50 ML/HR: 9 INJECTION, SOLUTION INTRAVENOUS at 08:35

## 2017-06-10 RX ADMIN — MORPHINE SULFATE 2 MG: 2 INJECTION, SOLUTION INTRAMUSCULAR; INTRAVENOUS at 21:26

## 2017-06-10 RX ADMIN — MORPHINE SULFATE 2 MG: 2 INJECTION, SOLUTION INTRAMUSCULAR; INTRAVENOUS at 05:02

## 2017-06-10 RX ADMIN — MORPHINE SULFATE 2 MG: 2 INJECTION, SOLUTION INTRAMUSCULAR; INTRAVENOUS at 10:40

## 2017-06-10 RX ADMIN — LORAZEPAM 1 MG: 2 INJECTION INTRAMUSCULAR; INTRAVENOUS at 05:02

## 2017-06-10 NOTE — PLAN OF CARE
Problem: Pressure Ulcer Risk (Sawyer Scale) (Adult,Obstetrics,Pediatric)  Goal: Identify Related Risk Factors and Signs and Symptoms  Outcome: Outcome(s) achieved Date Met:  06/10/17    06/10/17 1148   Pressure Ulcer Risk (Sawyer Scale)   Related Risk Factors (Pressure Ulcer Risk (Sawyer Scale)) age extremes;cognitive impairment;fluid intake inadequate;mobility impaired;nutritional deficiencies

## 2017-06-10 NOTE — PROGRESS NOTES
[facility] Visit Report    Gertrudis Rao  [id number]  6/10/2017    Admission R/T [facility] Dx: YES    [facility]: Senile degeneration of the brain      Symptom  Management: Pain/restlessness management      Nursing/Medication Recommendations: No recommendations at this time      Psychosocial Issues and Recommendations: Provide support to patient and to family      Spiritual Concerns and Recommendations: None at present       [facility] Plans:  None at present, continue to monitor for possible decline, is receiving IVF and IV medications for pain/restlessness and is GIP appropriate.      Review of Visit: Close monitoring for safety/falls, symptom management of pain/restlessness, comfort care. Patient lying in bed, asleep, medicated short time ago. Breathing unlabored, color pale. HR 45 and /54 this am. Spoke to daughter and 2 sons at the bedside regarding patient status and they voiced satisfaction with her care and feel she is doing better, woke up this am and ate some breakfast.  came into room and discussed disease progression and that the patient would be monitored over the next few days to determine if patient would show signs of further decline or would stabilize. Discussed several options for care, remain at [facility], transfer to [facility] I[facility] or return to her NH with [facility] following there. They verbalized understanding and know  that someone from the [facility] team and  would round daily to asses the patient. Emotional support provided and encouraged them to contact [facility] 24/7 for any questions or concerns.  Spoke to staff MARÍA Washburn regarding care needs and patient has received IV Morphine 2mg x 2 doses and IV Ativan 1mg x 2 doses since midnight. Also has IVF NS at 50cc/hr.  Will continue to see daily to assess needs, monitor status and offer support.      Vicky Wadsworth RN  [facility] Visit Nurse

## 2017-06-10 NOTE — PLAN OF CARE
"Problem: Dying Patient, Actively (Adult)  Goal: Comfort/Pain Control  Outcome: Ongoing (interventions implemented as appropriate)  Goal: Dying Process, Peace and Dignity  Outcome: Ongoing (interventions implemented as appropriate)    Problem: Fall Risk (Adult)  Goal: Absence of Falls  Outcome: Ongoing (interventions implemented as appropriate)    Problem: Patient Care Overview (Adult)  Goal: Plan of Care Review  Outcome: Ongoing (interventions implemented as appropriate)    06/10/17 0521   Coping/Psychosocial Response Interventions   Plan Of Care Reviewed With patient;family   Patient Care Overview   Progress declining   Outcome Evaluation   Outcome Summary/Follow up Plan Pt mental status declining with more frequent c/o \"feeling worms\" in her hands and body, pt reoriented, reassured and medicated. Pt is pleasant through confusion/tactile hallucination., in general pt is more confused this shift. Family at bsd through noc and supportive of pt. Continue to monitor and tx per POC and MD orders.            "

## 2017-06-10 NOTE — PLAN OF CARE
Problem: Dying Patient, Actively (Adult)  Goal: Comfort/Pain Control  Outcome: Ongoing (interventions implemented as appropriate)  Goal: Dying Process, Peace and Dignity  Outcome: Ongoing (interventions implemented as appropriate)    Problem: Fall Risk (Adult)  Goal: Absence of Falls  Outcome: Ongoing (interventions implemented as appropriate)    Problem: Patient Care Overview (Adult)  Goal: Plan of Care Review  Outcome: Ongoing (interventions implemented as appropriate)    06/10/17 1549   Coping/Psychosocial Response Interventions   Plan Of Care Reviewed With patient;family   Patient Care Overview   Progress no change   Outcome Evaluation   Outcome Summary/Follow up Plan Pt states she is feeling great, but at times does get restless and appear uncomfortable. Pt medicated PRN for comfort. Pt eating and drinking small amounts. Family at bsd.         Problem: Pressure Ulcer Risk (Sawyer Scale) (Adult,Obstetrics,Pediatric)  Goal: Skin Integrity  Outcome: Ongoing (interventions implemented as appropriate)

## 2017-06-10 NOTE — PROGRESS NOTES
Palliative Care/Hospice Follow Up Note       LOS: 2 days   Patient Care Team:  Tigre Rao Jr., MD as PCP - General (Geriatric Medicine)    Chief Complaint:  Senile dementia    Interval History:     Patient Complaints: None; not awake.  Patient Denies:  None.  History taken from:  The patient's daughter and 2 sons; and [facility] nurse.    Review of Systems: Unable to obtain; the patient is not awake.    Palliative Performance Scale  Palliative Performance Scale Score: 30%  [place] Symptom Assessment System Revised  Pain Score: no pain  Tiredness Score: 6  Nausea Score: No nausea  Depression Score: unable to assess  Anxiety Score: 5  Drowsiness Score: 6  Lack of Appetite Score: 6  Wellbeing Score: unable to assess  Dyspnea Score: No shortness of breath  Other Problem Score: unable to assess  Source of Information: healthcare professional caregiver, family caregiver, patient  Intervention: medicated/see MAR  Intervention Response: tolerated    Vital Signs  Temp:  [97.9 °F (36.6 °C)-98.2 °F (36.8 °C)] 98.2 °F (36.8 °C)  Heart Rate:  [45-61] 45  Resp:  [12-16] 12  BP: (101-105)/(54-56) 101/54  O2 Device: room air SpO2:  [97 %-98 %] 97 %    Physical Exam:  General Appearance:    Not awake and in no acute distress   Throat:   No oral lesions, oral mucosa moist   Neck:   No adenopathy, supple, trachea midline   Lungs:     Clear to auscultation, respirations regular, even and not    labored    Heart:    Regular rhythm and normal rate   Abdomen:     Occasional bowel sounds, no masses, no organomegaly, soft and non-tender, non-distended   Extremities:   No edema, no cyanosis   Pulses:   Radial pulses palpable and equal bilaterally          Results Review:     I reviewed the patient's new clinical results.    Medication Reviewed.    Assessment/Plan     Principal Problem:    Senile dementia with behavioral disturbance  Active Problems:    Hospice care patient    Candidal urinary tract infection    Legally blind    H/O  kyphoplasty      The hospice nurse was in the room when I entered.  I discussed with the patient's daughter and 2 sons along with the [facility] nurse.  All of the patient's routine medicines have been discontinued.  The patient has required 2 doses of 1 mg Ativan thus far today.  3 doses given yesterday.  The patient has required 2 doses of 2 mg morphine thus far today.  The patient received 4 doses yesterday.    At this time, my opinion of the patient is showing some improvement with discontinuation of her other medications.  The family, especially the 2 sons, feels that the patient has improved with IV fluids.  I described the pros and cons of giving IV fluids.  The patient is taking by mouth as she can tolerate.    We explained the possible many scenarios.  If the patient shows significant decline, she probably will be kept at [facility].  Alternatively, if she is not showing decline, the inpatient unit may be considered.  Alternatively, she may be discharged back to nursing facility with a palliative/hospice physician taking care of her.    At this time, symptom management will be continued with goal of care being comfort.  Will observe over the weekend.    Plan for disposition:[facility].    Chetan Anderson MD  06/10/17  1:25 PM

## 2017-06-11 PROCEDURE — 99231 SBSQ HOSP IP/OBS SF/LOW 25: CPT | Performed by: INTERNAL MEDICINE

## 2017-06-11 PROCEDURE — 25010000002 LORAZEPAM PER 2 MG: Performed by: HOSPITALIST

## 2017-06-11 PROCEDURE — 25010000002 MORPHINE PER 10 MG: Performed by: HOSPITALIST

## 2017-06-11 RX ADMIN — MORPHINE SULFATE 2 MG: 2 INJECTION, SOLUTION INTRAMUSCULAR; INTRAVENOUS at 21:43

## 2017-06-11 RX ADMIN — LORAZEPAM 2 MG: 2 INJECTION INTRAMUSCULAR; INTRAVENOUS at 10:26

## 2017-06-11 RX ADMIN — SODIUM CHLORIDE 50 ML/HR: 9 INJECTION, SOLUTION INTRAVENOUS at 04:32

## 2017-06-11 RX ADMIN — MORPHINE SULFATE 2 MG: 2 INJECTION, SOLUTION INTRAMUSCULAR; INTRAVENOUS at 10:26

## 2017-06-11 RX ADMIN — LORAZEPAM 1 MG: 2 INJECTION INTRAMUSCULAR; INTRAVENOUS at 01:23

## 2017-06-11 RX ADMIN — LORAZEPAM 2 MG: 2 INJECTION INTRAMUSCULAR; INTRAVENOUS at 04:14

## 2017-06-11 RX ADMIN — MORPHINE SULFATE 2 MG: 2 INJECTION, SOLUTION INTRAMUSCULAR; INTRAVENOUS at 04:14

## 2017-06-11 RX ADMIN — LORAZEPAM 2 MG: 2 INJECTION INTRAMUSCULAR; INTRAVENOUS at 21:43

## 2017-06-11 RX ADMIN — MORPHINE SULFATE 2 MG: 2 INJECTION, SOLUTION INTRAMUSCULAR; INTRAVENOUS at 01:23

## 2017-06-11 NOTE — PLAN OF CARE
Problem: Dying Patient, Actively (Adult)  Goal: Comfort/Pain Control  Outcome: Ongoing (interventions implemented as appropriate)  Goal: Dying Process, Peace and Dignity  Outcome: Ongoing (interventions implemented as appropriate)    Problem: Fall Risk (Adult)  Goal: Absence of Falls  Outcome: Ongoing (interventions implemented as appropriate)    Problem: Patient Care Overview (Adult)  Goal: Plan of Care Review  Outcome: Ongoing (interventions implemented as appropriate)    06/11/17 0542   Coping/Psychosocial Response Interventions   Plan Of Care Reviewed With family   Patient Care Overview   Progress declining   Outcome Evaluation   Outcome Summary/Follow up Plan Pt more confused and restless this shift, pt required increased doses of meds this shift. Pt w/sx of discomfort. continues to have intermittent tactile hallucinations, always reports same tactile hallucination of worms. Family at bsd. Continue to monitor and tx per POC, MD orders.         Problem: Pressure Ulcer Risk (Sawyer Scale) (Adult,Obstetrics,Pediatric)  Goal: Skin Integrity  Outcome: Ongoing (interventions implemented as appropriate)

## 2017-06-11 NOTE — PLAN OF CARE
Problem: Dying Patient, Actively (Adult)  Goal: Comfort/Pain Control  Outcome: Ongoing (interventions implemented as appropriate)  Goal: Dying Process, Peace and Dignity  Outcome: Ongoing (interventions implemented as appropriate)    Problem: Fall Risk (Adult)  Goal: Absence of Falls  Outcome: Ongoing (interventions implemented as appropriate)    Problem: Patient Care Overview (Adult)  Goal: Plan of Care Review  Outcome: Ongoing (interventions implemented as appropriate)    06/11/17 5053   Coping/Psychosocial Response Interventions   Plan Of Care Reviewed With patient   Patient Care Overview   Progress no change   Outcome Evaluation   Outcome Summary/Follow up Plan pt has been medicated one time today, has rested well, will cont to monitor         Problem: Pressure Ulcer Risk (Sawyer Scale) (Adult,Obstetrics,Pediatric)  Goal: Skin Integrity  Outcome: Ongoing (interventions implemented as appropriate)

## 2017-06-11 NOTE — PROGRESS NOTES
[facility]    Gertrudis Rao  [id number]  6/11/2017    [facility]      Reason for [facility] Admission: Senile degeneration of the brain      Symptom  Management: Pain/restlessness      Nursing/Medication Recommendations: No new recommendations at present      Psychosocial Issues and Recommendations: Provide support to patient and family      Spiritual Concerns and Recommendations: None at present       [facility] Discharge Plans:  None at present, continue to monitor for decline, patient receiving IV medications for comfort and is appropriate for GIP      Review of Visit: Close monitoring for safety/falls, symptom management of pain/restlessness, comfort care for declining patient. Patient lying in bed, roused when name called, tried to answer questions, very weak. Color pale and breathing shallow. Son reports that patient is eating and drinking, she drank water while I was in room. Discussed disease progression with the son and he is aware and accepting, understands overall prognosis but wants to allow her to eat if she wants to and I encouraged that as long as she is awake and alert enough to swallow safely. Emotional support provided to patient and son and encouraged [facility] to [facility] for any questions or concerns. Spoke to staff RN and patient has received IV Morphine 2mg x 3 doses, IV Ativan 1mg x 1 dose and IV Ativan 2mg x 2 doses, also receiving IVF NS at 50cc/hr. Will continue to see daily to assess needs, monitor status and offer support.      Vicky Wadsworth RN  [facility] Visit Nurse

## 2017-06-11 NOTE — PROGRESS NOTES
Palliative Care/Hospice Follow Up Note       LOS: 3 days   Patient Care Team:  Tigre Rao Jr., MD as PCP - General (Geriatric Medicine)    Chief Complaint:  Senile dementia    Interval History:     Patient Complaints: None.  Patient Denies:  None.  History taken from:  The patient's son.    Review of Systems: Unable to obtain; the patient is not awake.    Palliative Performance Scale  Palliative Performance Scale Score: 30%  [place] Symptom Assessment System Revised  Pain Score: no pain  Tiredness Score: 5  Nausea Score: No nausea  Depression Score: No depression  Anxiety Score: No anxiety  Drowsiness Score: 5  Lack of Appetite Score: 8  Wellbeing Score: unable to assess  Dyspnea Score: No shortness of breath  Other Problem Score: 4 (intermittent tactile hallucinations)  Source of Information: healthcare professional caregiver  Intervention: medicated/see MAR  Intervention Response: tolerated    Vital Signs  Temp:  [97.2 °F (36.2 °C)-98.5 °F (36.9 °C)] 97.2 °F (36.2 °C)  Heart Rate:  [56-66] 64  Resp:  [12-16] 12  BP: (100-109)/(54-66) 100/66  O2 Device: room air SpO2:  [96 %-97 %] 97 %    Physical Exam:  General Appearance:    Awakened and in no acute distress   Throat:   No oral lesions, oral mucosa moist   Neck:   No adenopathy, supple, trachea midline   Lungs:     Clear to auscultation, respirations regular, even and not    labored    Heart:    Regular rhythm and normal rate   Abdomen:     Occasional bowel sounds, no masses, no organomegaly, soft and non-tender, non-distended   Extremities:   No edema, no cyanosis   Pulses:   Radial pulses palpable and equal bilaterally          Results Review:     I reviewed the patient's new clinical results.    Medication Reviewed.    Assessment/Plan     Principal Problem:    Senile dementia with behavioral disturbance  Active Problems:    Hospice care patient    Candidal urinary tract infection    Legally blind    H/O kyphoplasty      I discussed with the patient's son  Moe) at bedside.  Similar questions were again answered.  He again ask about the knees urinary tract infection.  I told him that was a result of her overall condition and was not causing her present condition.  He related incidents of delirium when she awakens.  The patient has required 1 doses of 1 mg and 1 dose 2 mg Ativan thus far today.  4 doses given yesterday.  The patient has required 2 doses of 2 mg morphine thus far today.  The patient received 4 doses yesterday.    The same disposition options exist.  If the patient shows significant decline, she probably stay at [facility].  Alternatively, if she is not showing decline, the inpatient unit may be considered.  Alternatively, she may be discharged back to nursing facility with a palliative/hospice physician taking care of her.    At this time, symptom management will be continued with goal of care being comfort.      Plan for disposition:HSB.    Chetan Anderson MD  06/11/17  7:41 AM

## 2017-06-12 PROCEDURE — 99231 SBSQ HOSP IP/OBS SF/LOW 25: CPT | Performed by: INTERNAL MEDICINE

## 2017-06-12 PROCEDURE — 25010000002 HALOPERIDOL LACTATE PER 5 MG: Performed by: HOSPITALIST

## 2017-06-12 PROCEDURE — 25010000002 LORAZEPAM PER 2 MG: Performed by: HOSPITALIST

## 2017-06-12 PROCEDURE — 25010000002 MORPHINE PER 10 MG: Performed by: HOSPITALIST

## 2017-06-12 RX ORDER — BISACODYL 10 MG
10 SUPPOSITORY, RECTAL RECTAL DAILY PRN
Status: DISCONTINUED | OUTPATIENT
Start: 2017-06-12 | End: 2017-06-13 | Stop reason: HOSPADM

## 2017-06-12 RX ORDER — DOCUSATE SODIUM 100 MG/1
200 CAPSULE, LIQUID FILLED ORAL NIGHTLY
Status: DISCONTINUED | OUTPATIENT
Start: 2017-06-12 | End: 2017-06-13 | Stop reason: HOSPADM

## 2017-06-12 RX ADMIN — HALOPERIDOL LACTATE 2 MG: 5 INJECTION, SOLUTION INTRAMUSCULAR at 21:51

## 2017-06-12 RX ADMIN — MORPHINE SULFATE 2 MG: 2 INJECTION, SOLUTION INTRAMUSCULAR; INTRAVENOUS at 02:32

## 2017-06-12 RX ADMIN — LORAZEPAM 2 MG: 2 INJECTION INTRAMUSCULAR; INTRAVENOUS at 20:06

## 2017-06-12 RX ADMIN — MORPHINE SULFATE 2 MG: 2 INJECTION, SOLUTION INTRAMUSCULAR; INTRAVENOUS at 20:06

## 2017-06-12 RX ADMIN — LORAZEPAM 2 MG: 2 INJECTION INTRAMUSCULAR; INTRAVENOUS at 02:33

## 2017-06-12 RX ADMIN — LORAZEPAM 2 MG: 2 INJECTION INTRAMUSCULAR; INTRAVENOUS at 22:27

## 2017-06-12 RX ADMIN — MORPHINE SULFATE 2 MG: 2 INJECTION, SOLUTION INTRAMUSCULAR; INTRAVENOUS at 15:17

## 2017-06-12 RX ADMIN — LORAZEPAM 2 MG: 2 INJECTION INTRAMUSCULAR; INTRAVENOUS at 15:17

## 2017-06-12 RX ADMIN — BISACODYL 10 MG: 10 SUPPOSITORY RECTAL at 18:40

## 2017-06-12 RX ADMIN — MORPHINE SULFATE 2 MG: 2 INJECTION, SOLUTION INTRAMUSCULAR; INTRAVENOUS at 22:27

## 2017-06-12 RX ADMIN — DOCUSATE SODIUM 200 MG: 100 CAPSULE, LIQUID FILLED ORAL at 20:06

## 2017-06-12 NOTE — PLAN OF CARE
Problem: Dying Patient, Actively (Adult)  Goal: Comfort/Pain Control  Outcome: Ongoing (interventions implemented as appropriate)  Goal: Dying Process, Peace and Dignity  Outcome: Ongoing (interventions implemented as appropriate)    Problem: Fall Risk (Adult)  Goal: Absence of Falls  Outcome: Ongoing (interventions implemented as appropriate)    Problem: Patient Care Overview (Adult)  Goal: Plan of Care Review  Outcome: Ongoing (interventions implemented as appropriate)    06/11/17 1736 06/12/17 1106 06/12/17 1701   Coping/Psychosocial Response Interventions   Plan Of Care Reviewed With --  patient;family --    Patient Care Overview   Progress no change --  --    Outcome Evaluation   Outcome Summary/Follow up Plan --  --  Pt resting comfortably. Mediated x 1 for discomfort. Family request supp when pt wakes. Will conitnue to monitor.       Goal: Adult Individualization and Mutuality  Outcome: Ongoing (interventions implemented as appropriate)  Goal: Discharge Needs Assessment  Outcome: Ongoing (interventions implemented as appropriate)    Problem: Pressure Ulcer Risk ([place] Scale) (Adult,Obstetrics,Pediatric)  Goal: Skin Integrity  Outcome: Ongoing (interventions implemented as appropriate)

## 2017-06-12 NOTE — PLAN OF CARE
Problem: Dying Patient, Actively (Adult)  Goal: Comfort/Pain Control  Outcome: Ongoing (interventions implemented as appropriate)  Goal: Dying Process, Peace and Dignity  Outcome: Ongoing (interventions implemented as appropriate)    Problem: Fall Risk (Adult)  Goal: Absence of Falls  Outcome: Ongoing (interventions implemented as appropriate)    Problem: Patient Care Overview (Adult)  Goal: Plan of Care Review  Outcome: Ongoing (interventions implemented as appropriate)    Problem: Pressure Ulcer Risk ([place] Scale) (Adult,Obstetrics,Pediatric)  Goal: Skin Integrity  Outcome: Ongoing (interventions implemented as appropriate)

## 2017-06-12 NOTE — PROGRESS NOTES
[facility] Visit Report    Gertrudis Rao  [id number]  6/12/2017    Admission R/T [facility] Dx: yes    Reason for [facility] Admission:    Symptom  Management: Restlessness    Nursing/Medication Recommendations:    Psychosocial Issues and Recommendations:    Spiritual Concerns and Recommendations:    [facility] Discharge Plans:  incomplete; patient remains HSB and [facility] will round daily      Review of Visit (Include All Collaboration- including names of hospital and family involved during admission/visit):  patient sitting up in bed eating lunch with daughter at the bediside. patient awake and slightly confused but pleasant. patient denies pain at this time. patient recieved ativan 1mg iv x1, ativan 2mg iv x4, and morphine 2mg iv x5 in the last 24 hours for symptom management. no plans to discharge at this time. will cont to visit daily to assess needs and offer support.    Annmarie Schaefer RN

## 2017-06-13 VITALS
SYSTOLIC BLOOD PRESSURE: 96 MMHG | RESPIRATION RATE: 18 BRPM | HEART RATE: 53 BPM | HEIGHT: 63 IN | DIASTOLIC BLOOD PRESSURE: 52 MMHG | OXYGEN SATURATION: 97 % | TEMPERATURE: 97.6 F | WEIGHT: 150 LBS | BODY MASS INDEX: 26.58 KG/M2

## 2017-06-13 PROCEDURE — 25010000002 MORPHINE PER 10 MG: Performed by: HOSPITALIST

## 2017-06-13 PROCEDURE — 99239 HOSP IP/OBS DSCHRG MGMT >30: CPT | Performed by: INTERNAL MEDICINE

## 2017-06-13 PROCEDURE — 25010000002 LORAZEPAM PER 2 MG: Performed by: HOSPITALIST

## 2017-06-13 RX ADMIN — LORAZEPAM 2 MG: 2 INJECTION INTRAMUSCULAR; INTRAVENOUS at 02:11

## 2017-06-13 RX ADMIN — MORPHINE SULFATE 2 MG: 2 INJECTION, SOLUTION INTRAMUSCULAR; INTRAVENOUS at 02:11

## 2017-06-13 RX ADMIN — LORAZEPAM 2 MG: 2 INJECTION INTRAMUSCULAR; INTRAVENOUS at 08:50

## 2017-06-13 RX ADMIN — MORPHINE SULFATE 2 MG: 2 INJECTION, SOLUTION INTRAMUSCULAR; INTRAVENOUS at 08:50

## 2017-06-13 NOTE — PROGRESS NOTES
[facility]       LOS: 4 days   Patient Care Team:  Tigre Rao Jr., MD as PCP - General (Geriatric Medicine)    Chief Complaint:  Senile dementia    Interval History:     Patient Complaints: None.  Patient Denies:  None.  History taken from:  The patient's daughter and RN.    Review of Systems: The patient is awake; no pain or SOA.  She has not had a BM in the last several days. She ate a little better.    Palliative Performance Scale  Palliative Performance Scale Score: 30%  [place] Symptom Assessment System Revised  Pain Score: no pain  Tiredness Score: 7  Nausea Score: No nausea  Depression Score: No depression  Anxiety Score: No anxiety  Drowsiness Score: 7  Lack of Appetite Score: 8  Wellbeing Score: unable to assess  Dyspnea Score: No shortness of breath  Other Problem Score: 2  Source of Information: family caregiver, healthcare professional caregiver  Intervention: medicated/see MAR  Intervention Response: tolerated    Vital Signs  Temp:  [97 °F (36.1 °C)-97.5 °F (36.4 °C)] 97 °F (36.1 °C)  Heart Rate:  [66-68] 66  Resp:  [16] 16  BP: (103-120)/(53-57) 120/57  O2 Device: room air SpO2:  [97 %-100 %] 100 %    Physical Exam:  General Appearance:    Awakene and in no acute distress   Throat:   No oral lesions, oral mucosa moist   Neck:   No adenopathy, supple, trachea midline   Lungs:     Clear to auscultation, respirations regular, even and not    labored    Heart:    Regular rhythm and normal rate   Abdomen:     Occasional bowel sounds, no masses, no organomegaly, soft and non-tender, non-distended   Extremities:   No edema, no cyanosis   Pulses:   Radial pulses palpable and equal bilaterally          Results Review:     I reviewed the patient's new clinical results.    Medication Reviewed.    Assessment/Plan     Principal Problem:    Senile dementia with behavioral disturbance  Active Problems:    Hospice care patient    Candidal urinary tract infection    Legally blind     H/O kyphoplasty      I discussed with the patient's daughter at bedside.  The patient has required 2 doses of 2 mg Ativan thus far today.  3 doses given yesterday.  The patient has required 2 doses of 2 mg morphine thus far today.  The patient received 4 doses yesterday.    The same disposition options exist.  If the patient shows significant decline, she probably stay at [facility].  Alternatively, if she is not showing decline, the inpatient unit may be considered.  Alternatively, she may be discharged back to nursing facility with a palliative/hospice physician taking care of her.    At this time, symptom management will be continued with goal of care being comfort.  Suppository will be given for constipation and stool softner will be started.  IVFs will be stopped since she is taking PO better.    Plan for disposition:HSB.    Chetan Anderson MD  06/12/17  8:11 PM

## 2017-06-13 NOTE — PROGRESS NOTES
Discharge Planning Assessment  [facility]     Patient Name: Gertrudis Rao  MRN: [id number]  Today's Date: 6/13/2017    Admit Date: 6/8/2017          Discharge Needs Assessment     None            Discharge Plan       06/13/17 1153    Case Management/Social Work Plan    Additional Comments Annmarie/Nimesh/RN spoke with the family and they are agreeable to the inpatient unit of [facility]. The patient was placed on list for the inpatient unit. RUTH augustin Rn, CCP        Discharge Placement     Facility/Agency Request Status Selected? Address Phone Number Fax Number    [facility] Accepted    [facility] [id number] [facility] [id number] [phone]        Jennifer Milton RN 6/9/2017 15:21    6/9/17 @ 1520  [facility] 6/8/17 @ 1611....[facility]                           Demographic Summary     None            Functional Status     None            Psychosocial     None            Abuse/Neglect     None            Legal     None            Substance Abuse     None            Patient Forms     None          Fiorella Augustin RN

## 2017-06-13 NOTE — DISCHARGE SUMMARY
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 female presented with progressive confusion and disorientation, as well as hallucination. She was initially broadly covered with ertapenem, secondary to concerns of multidrug-resistant UTI. Urine culture ended up growing Candida.  After discussion with the family, goals of care were changed to palliative, the patient's agitation and hallucinations were quite pronounced.  Dr. Jarred Licona had an extensive conversation with the daughter who was healthcare surrogate and basically the whole time that discussion occurred, the patient had conversations with people who were not in the room. Her delirium was quite pronounced as well as her hallucinations, and given her past history of dementia and no actual source for an active infection other than the yeast UTI secondary to previously indwelling Saini catheter as well as recurrent use of antibiotics. Ultimately the daughter wanted to meet with her family and discuss this further with her siblings.  Dr Licona received a call that the daughter would like to transition the patient over to more of a comfort care regimen. The patient was transferred to [facility] and implemented the palliative care order set; with treatment the patient appeared better in regards to her agitation, delirium, and encephalopathy.  [facility] and the patient was discharged and readmitted as a HSB.  I talked with the patient's daughter and her 2 brothers.  Initially, IVFs were initiated.  These were subsequently stopped since the patient started taking PO better.  Saini cath remained.  Symptom  management was continued.  The patient did awaken and was somewhat lucid at times and easily confused at other times.  The patient has demonstrated some stability and is being transferred to the [facility].  Prior to transfer, I did discuss with the patient's daughter by phone (approximately around 1130).      Consults:   Consults     Date and Time Order Name Status Description    6/6/2017 6375 [facility] (on-call MD unless specified) Completed           Pertinent Test Results:   Results for SHARLENE ROSADO (MRN [id number]) as of 6/13/2017 18:56   Ref. Range 6/7/2017 04:55   Glucose Latest Ref Range: 65 - 99 mg/dL 102 (H)   Sodium Latest Ref Range: 136 - 145 mmol/L 132 (L)   Potassium Latest Ref Range: 3.5 - 5.2 mmol/L 4.5   CO2 Latest Ref Range: 22.0 - 29.0 mmol/L 25.7   Chloride Latest Ref Range: 98 - 107 mmol/L 97 (L)   Anion Gap Latest Units: mmol/L 9.3   Creatinine Latest Ref Range: 0.57 - 1.00 mg/dL 0.89   BUN Latest Ref Range: 8 - 23 mg/dL 15   BUN/Creatinine Ratio Latest Ref Range: 7.0 - 25.0  16.9   Calcium Latest Ref Range: 8.2 - 9.6 mg/dL 8.7   eGFR Non African Amer Latest Ref Range: >60 mL/min/1.73 59 (L)   Alkaline Phosphatase Latest Ref Range: 39 - 117 U/L 85   Total Protein Latest Ref Range: 6.0 - 8.5 g/dL 5.8 (L)   ALT (SGPT) Latest Ref Range: 1 - 33 U/L 6   AST (SGOT) Latest Ref Range: 1 - 32 U/L 15   Total Bilirubin Latest Ref Range: 0.1 - 1.2 mg/dL 0.4   Albumin Latest Ref Range: 3.50 - 5.20 g/dL 3.50   Globulin Latest Units: gm/dL 2.3   A/G Ratio Latest Units: g/dL 1.5   Hemoglobin A1C Latest Ref Range: 4.80 - 5.60 % 5.70 (H)   WBC Latest Ref Range: 4.50 - 10.70 10*3/mm3 4.99   RBC Latest Ref Range: 3.90 - 5.20 10*6/mm3 3.22 (L)   Hemoglobin Latest Ref Range: 11.9 - 15.5 g/dL 9.9 (L)   Hematocrit Latest Ref Range: 35.6 - 45.5 % 29.1 (L)   RDW Latest Ref Range: 11.7 - 13.0 % 13.3 (H)   MCV Latest Ref Range: 80.5 - 98.2 fL 90.4   MCH Latest Ref Range: 26.9 - 32.0 pg 30.7   MCHC Latest Ref Range: 32.4 - 36.3 g/dL  34.0   MPV Latest Ref Range: 6.0 - 12.0 fL 9.2   Platelets Latest Ref Range: 140 - 500 10*3/mm3 181   RDW-SD Latest Ref Range: 37.0 - 54.0 fl 44.2   Neutrophil % Latest Ref Range: 42.7 - 76.0 % 50.9   Lymphocyte % Latest Ref Range: 19.6 - 45.3 % 31.9   Monocyte % Latest Ref Range: 5.0 - 12.0 % 15.8 (H)   Eosinophil % Latest Ref Range: 0.3 - 6.2 % 1.2   Basophil % Latest Ref Range: 0.0 - 1.5 % 0.2   Immature Grans % Latest Ref Range: 0.0 - 0.5 % 0.0   Neutrophils, Absolute Latest Ref Range: 1.90 - 8.10 10*3/mm3 2.54   Lymphocytes, Absolute Latest Ref Range: 0.90 - 4.80 10*3/mm3 1.59   Monocytes, Absolute Latest Ref Range: 0.20 - 1.20 10*3/mm3 0.79   Eosinophils, Absolute Latest Ref Range: 0.00 - 0.70 10*3/mm3 0.06   Basophils, Absolute Latest Ref Range: 0.00 - 0.20 10*3/mm3 0.01   Immature Grans, Absolute Latest Ref Range: 0.00 - 0.03 10*3/mm3 0.00       Condition on Discharge:  Fair    Vital Signs  Temp:  [97.6 °F (36.4 °C)] 97.6 °F (36.4 °C)  Heart Rate:  [53] 53  Resp:  [18] 18  BP: (96)/(52) 96/52     Physical Exam:     General Appearance:  Awakened and in no acute distress   Throat: No oral lesions, oral mucosa moist   Neck: No adenopathy, supple, trachea midline   Lungs:  Clear to auscultation, respirations regular, even and not labored   Heart:  Regular rhythm and normal rate   Abdomen:  Occasional bowel sounds, no masses, no organomegaly, soft and non-tender, non-distended   Extremities: No edema, no cyanosis   Pulses: Radial pulses palpable and equal bilaterally         Urine Culture 6/6/2017:    Specimen Information: Urine, Catheter; Urine        Culture   >100,000 CFU/mL Candida albicans (A)              CXR 6/6/2017:  IMPRESSION:  No focal pulmonary consolidation. Borderline heart size.  Tortuous aorta.    CT Head 6/6/2017:  IMPRESSION:  No significant change when compared to prior head CT from  [facility] 10/01/2015. There is mild small vessel disease  in cerebral white matter, calcified  atherosclerotic plaques in the  distal left vertebral artery and cavernous segments of internal carotid  arteries bilaterally and degenerative changes in the temporomandibular  joints bilaterally right greater than left. The remainder of the head CT  is within normal limits and the etiology of the altered mental status  and visual hallucinations is not established on this exam.      Discharge Disposition  Hospice/Medical Facility (DC - External)    Discharge Medications:  See MAR    Discharge Diet: Regular as tolerated.    Activity at Discharge: As tolerated    Follow-up Appointments  No future appointments.        Chetan Anderson MD  06/13/17  6:49 PM    Time: 40 minutes.

## 2017-06-13 NOTE — PROGRESS NOTES
[facility] Visit Report    Gertrudis Rao  [id number]  6/13/2017    Admission R/T [facility] Dx: yes    Reason for [facility] Admission:    Symptom  Management: Restlessness    Nursing/Medication Recommendations:    Psychosocial Issues and Recommendations:    Spiritual Concerns and Recommendations:    [facility] Discharge Plans:  incomplete; patient remains HSB and [facility] will round daily      Review of Visit (Include All Collaboration- including names of hospital and family involved during admission/visit):  patient resting in bed with daughter at the bedside. patietn sleeping and did not wake while i was speaking with daughter. daughter reports more confusion to day than yesterday. spokewith care manager and daughter about transfer to [facility] when bed available. both are in agreement. all form faxed to [facility]. patient recieved haldol 2mg iv x1, ativan 2mg iv x5, and [id number] x5 in the last 24 hours for symptom management. patient still eating and drinking some. will con tto visit daily to assess needs and offer support to the family    Annmarie Schaefer RN

## 2017-06-13 NOTE — PLAN OF CARE
Problem: Dying Patient, Actively (Adult)  Goal: Comfort/Pain Control  Outcome: Ongoing (interventions implemented as appropriate)  Goal: Dying Process, Peace and Dignity  Outcome: Ongoing (interventions implemented as appropriate)    Problem: Fall Risk (Adult)  Goal: Absence of Falls  Outcome: Ongoing (interventions implemented as appropriate)    Problem: Patient Care Overview (Adult)  Goal: Plan of Care Review  Outcome: Ongoing (interventions implemented as appropriate)  Continue symptom management and comfort     17 1736 17 2235 17 0325   Coping/Psychosocial Response Interventions   Plan Of Care Reviewed With --  patient;family --    Patient Care Overview   Progress no change --  --    Outcome Evaluation   Outcome Summary/Follow up Plan --  --  Pt medicated for pain and anxiety 3 times per family request. Still no BM. Family at bedside.       Goal: Adult Individualization and Mutuality  Outcome: Ongoing (interventions implemented as appropriate)  Goal: Discharge Needs Assessment  Outcome: Ongoing (interventions implemented as appropriate)    Problem: Pressure Ulcer Risk ([place] Scale) (Adult,Obstetrics,Pediatric)  Goal: Skin Integrity  Outcome: Ongoing (interventions implemented as appropriate)

## 2017-06-14 NOTE — PROGRESS NOTES
Discharge Planning Assessment  [facility]     Patient Name: Gertrudis Rao  MRN: [id number]  Today's Date: 6/14/2017    Admit Date: 6/8/2017          Discharge Needs Assessment     None            Discharge Plan       06/14/17 1311    Final Note    Final Note The patient was transferred to the inpatient unit of [facility] on 6/13/17 @ 16:15 by [facility]. RUTH Thurman RN, CCP        Discharge Placement     Facility/Agency Request Status Selected? Address Phone Number Fax Number    [facility] Accepted    [facility] [id number] [facility] [id number] [phone]        Jennifer Milton RN 6/9/2017 15:21    6/9/17 @ 1520  [facility] 6/8/17 @ 1611....[facility]                   Expected Discharge Date and Time     Expected Discharge Date Expected Discharge Time    Jun 13, 2017  4:29 PM              Demographic Summary     None            Functional Status     None            Psychosocial     None            Abuse/Neglect     None            Legal     None            Substance Abuse     None            Patient Forms     None          Fiorella Thurman, RN